# Patient Record
Sex: FEMALE | Race: WHITE | URBAN - METROPOLITAN AREA
[De-identification: names, ages, dates, MRNs, and addresses within clinical notes are randomized per-mention and may not be internally consistent; named-entity substitution may affect disease eponyms.]

---

## 2022-11-14 ENCOUNTER — OFFICE VISIT (OUTPATIENT)
Dept: URBAN - METROPOLITAN AREA CLINIC 58 | Facility: CLINIC | Age: 67
End: 2022-11-14
Payer: MEDICARE

## 2022-11-14 DIAGNOSIS — H40.013 OPEN ANGLE WITH BORDERLINE FINDINGS, LOW RISK, BILATERAL: ICD-10-CM

## 2022-11-14 DIAGNOSIS — H25.13 AGE-RELATED NUCLEAR CATARACT, BILATERAL: Primary | ICD-10-CM

## 2022-11-14 PROCEDURE — 99204 OFFICE O/P NEW MOD 45 MIN: CPT | Performed by: OPHTHALMOLOGY

## 2022-11-14 ASSESSMENT — KERATOMETRY
OD: 45.50
OS: 45.25

## 2022-11-14 ASSESSMENT — VISUAL ACUITY
OD: 20/30
OS: 20/40

## 2022-11-14 ASSESSMENT — INTRAOCULAR PRESSURE
OD: 14
OS: 14

## 2022-11-14 NOTE — IMPRESSION/PLAN
Impression: Open angle with borderline findings, low risk, bilateral: H40.013. Plan: Further evaluation with Dr. Nagi Ramirez after [address].

## 2022-11-14 NOTE — IMPRESSION/PLAN
Impression: Age-related nuclear cataract, bilateral: H25.13. Plan: Cataracts account for the patient's complaints. Discussed all risks, benefits, procedures and recovery for cataract surgery in detail with the patient. Patient understands changing glasses will not improve vision. Patient desires to have surgery, recommend phacoemulsification with intraocular lens implant. Discussed lens implant options. Patient would like standard lens with a distance refractive goal.  Patient understands that glasses are needed after surgery. Patient is Dextenza candidate.  RL2.

## 2023-01-06 ENCOUNTER — TESTING ONLY (OUTPATIENT)
Dept: URBAN - METROPOLITAN AREA CLINIC 58 | Facility: CLINIC | Age: 68
End: 2023-01-06
Payer: MEDICARE

## 2023-01-06 DIAGNOSIS — H25.13 AGE-RELATED NUCLEAR CATARACT, BILATERAL: Primary | ICD-10-CM

## 2023-01-06 PROCEDURE — W9997 IOL SELECTION: HCPCS | Performed by: OPHTHALMOLOGY

## 2023-01-06 ASSESSMENT — PACHYMETRY
OS: 3.37
OD: 23.40
OS: 23.29
OD: 4.01

## 2023-01-16 ENCOUNTER — SURGERY (OUTPATIENT)
Dept: URBAN - METROPOLITAN AREA SURGERY 32 | Facility: SURGERY | Age: 68
End: 2023-01-16
Payer: MEDICARE

## 2023-01-16 DIAGNOSIS — H25.13 AGE-RELATED NUCLEAR CATARACT, BILATERAL: Primary | ICD-10-CM

## 2023-01-16 PROCEDURE — 66984 XCAPSL CTRC RMVL W/O ECP: CPT | Performed by: OPHTHALMOLOGY

## 2023-01-17 ENCOUNTER — POST-OPERATIVE VISIT (OUTPATIENT)
Dept: URBAN - METROPOLITAN AREA CLINIC 58 | Facility: CLINIC | Age: 68
End: 2023-01-17
Payer: MEDICARE

## 2023-01-17 DIAGNOSIS — Z48.810 ENCOUNTER FOR SURGICAL AFTERCARE FOLLOWING SURGERY ON A SENSE ORGAN: Primary | ICD-10-CM

## 2023-01-17 PROCEDURE — 99024 POSTOP FOLLOW-UP VISIT: CPT | Performed by: OPTOMETRIST

## 2023-01-17 ASSESSMENT — INTRAOCULAR PRESSURE
OD: 18
OS: 18

## 2023-01-24 ENCOUNTER — POST-OPERATIVE VISIT (OUTPATIENT)
Dept: URBAN - METROPOLITAN AREA CLINIC 58 | Facility: CLINIC | Age: 68
End: 2023-01-24
Payer: MEDICARE

## 2023-01-24 DIAGNOSIS — Z48.810 ENCOUNTER FOR SURGICAL AFTERCARE FOLLOWING SURGERY ON THE SENSE ORGANS: Primary | ICD-10-CM

## 2023-01-24 PROCEDURE — 99024 POSTOP FOLLOW-UP VISIT: CPT | Performed by: OPTOMETRIST

## 2023-01-24 ASSESSMENT — INTRAOCULAR PRESSURE
OD: 18
OS: 15

## 2023-01-30 ENCOUNTER — SURGERY (OUTPATIENT)
Dept: URBAN - METROPOLITAN AREA SURGERY 32 | Facility: SURGERY | Age: 68
End: 2023-01-30
Payer: MEDICARE

## 2023-01-30 DIAGNOSIS — H25.11 AGE-RELATED NUCLEAR CATARACT, RIGHT EYE: Primary | ICD-10-CM

## 2023-01-30 PROCEDURE — 66984 XCAPSL CTRC RMVL W/O ECP: CPT | Performed by: OPHTHALMOLOGY

## 2023-01-31 ENCOUNTER — SURGERY (OUTPATIENT)
Dept: URBAN - METROPOLITAN AREA SURGERY 32 | Facility: SURGERY | Age: 68
End: 2023-01-31
Payer: MEDICARE

## 2023-01-31 DIAGNOSIS — Z96.1 PRESENCE OF INTRAOCULAR LENS: Primary | ICD-10-CM

## 2023-01-31 PROCEDURE — 99024 POSTOP FOLLOW-UP VISIT: CPT | Performed by: OPTOMETRIST

## 2023-01-31 ASSESSMENT — INTRAOCULAR PRESSURE
OS: 17
OD: 16

## 2023-02-06 ENCOUNTER — SURGERY (OUTPATIENT)
Dept: URBAN - METROPOLITAN AREA SURGERY 32 | Facility: SURGERY | Age: 68
End: 2023-02-06
Payer: MEDICARE

## 2023-02-06 DIAGNOSIS — Z48.810 ENCOUNTER FOR SURGICAL AFTERCARE FOLLOWING SURGERY ON A SENSE ORGAN: Primary | ICD-10-CM

## 2023-02-06 PROCEDURE — 99024 POSTOP FOLLOW-UP VISIT: CPT | Performed by: OPHTHALMOLOGY

## 2023-02-06 RX ORDER — PREDNISOLONE ACETATE 10 MG/ML
1 % SUSPENSION/ DROPS OPHTHALMIC
Qty: 5 | Refills: 0 | Status: ACTIVE
Start: 2023-02-06

## 2023-02-06 ASSESSMENT — INTRAOCULAR PRESSURE
OS: 16
OD: 12

## 2023-02-06 NOTE — IMPRESSION/PLAN
Impression: S/P Cataract Extraction by phacoemulsification with IOL placement OD - 7 Days. Encounter for surgical aftercare following surgery on a sense organ  Z48.810. Excellent post op course   Post operative instructions reviewed - Plan: Start prednisolone BID  OU for foreign body sensation. Call if any worsening.

## 2023-03-08 ENCOUNTER — POST-OPERATIVE VISIT (OUTPATIENT)
Dept: URBAN - METROPOLITAN AREA CLINIC 58 | Facility: CLINIC | Age: 68
End: 2023-03-08
Payer: MEDICARE

## 2023-03-08 DIAGNOSIS — Z96.1 PRESENCE OF INTRAOCULAR LENS: Primary | ICD-10-CM

## 2023-03-08 PROCEDURE — 99024 POSTOP FOLLOW-UP VISIT: CPT | Performed by: OPTOMETRIST

## 2023-03-08 ASSESSMENT — INTRAOCULAR PRESSURE
OS: 11
OD: 11

## 2023-03-08 ASSESSMENT — VISUAL ACUITY
OD: 20/20
OS: 20/20

## 2023-03-08 NOTE — IMPRESSION/PLAN
Impression: S/P Cataract Extraction by phacoemulsification with IOL placement OD - 37 Days. Presence of intraocular lens  Z96.1.  Excellent post op course   Condition is improving - Plan: no f/u [place] Using prednisolone bid od

## 2023-11-24 ENCOUNTER — LAB (OUTPATIENT)
Dept: LAB | Facility: LAB | Age: 68
End: 2023-11-24
Payer: COMMERCIAL

## 2023-11-24 DIAGNOSIS — R19.7 DIARRHEA, UNSPECIFIED: Primary | ICD-10-CM

## 2023-11-24 PROCEDURE — 87329 GIARDIA AG IA: CPT

## 2023-11-24 PROCEDURE — 87328 CRYPTOSPORIDIUM AG IA: CPT

## 2023-11-28 LAB
CRYPTOSP AG STL QL IA: NEGATIVE
G LAMBLIA AG STL QL IA: NEGATIVE

## 2023-11-29 LAB — O+P STL MICRO: NEGATIVE

## 2024-01-22 ENCOUNTER — OFFICE VISIT (OUTPATIENT)
Dept: GASTROENTEROLOGY | Facility: CLINIC | Age: 69
End: 2024-01-22
Payer: MEDICARE

## 2024-01-22 ENCOUNTER — LAB (OUTPATIENT)
Dept: LAB | Facility: LAB | Age: 69
End: 2024-01-22
Payer: MEDICARE

## 2024-01-22 VITALS
HEIGHT: 65 IN | SYSTOLIC BLOOD PRESSURE: 138 MMHG | HEART RATE: 65 BPM | OXYGEN SATURATION: 97 % | BODY MASS INDEX: 22.99 KG/M2 | DIASTOLIC BLOOD PRESSURE: 88 MMHG | WEIGHT: 138 LBS

## 2024-01-22 DIAGNOSIS — R19.7 DIARRHEA, UNSPECIFIED TYPE: ICD-10-CM

## 2024-01-22 DIAGNOSIS — R19.7 DIARRHEA, UNSPECIFIED TYPE: Primary | ICD-10-CM

## 2024-01-22 PROBLEM — M32.9 LUPUS (MULTI): Status: ACTIVE | Noted: 2023-08-07

## 2024-01-22 PROBLEM — C50.919 BREAST CANCER (MULTI): Status: ACTIVE | Noted: 2023-08-07

## 2024-01-22 PROBLEM — F17.200 SMOKER: Status: ACTIVE | Noted: 2023-08-07

## 2024-01-22 LAB
CRP SERPL-MCNC: <0.1 MG/DL
TSH SERPL-ACNC: 2.27 MIU/L (ref 0.44–3.98)

## 2024-01-22 PROCEDURE — 84443 ASSAY THYROID STIM HORMONE: CPT

## 2024-01-22 PROCEDURE — 86140 C-REACTIVE PROTEIN: CPT

## 2024-01-22 PROCEDURE — 83516 IMMUNOASSAY NONANTIBODY: CPT

## 2024-01-22 PROCEDURE — 99204 OFFICE O/P NEW MOD 45 MIN: CPT | Performed by: INTERNAL MEDICINE

## 2024-01-22 PROCEDURE — 1159F MED LIST DOCD IN RCRD: CPT | Performed by: INTERNAL MEDICINE

## 2024-01-22 PROCEDURE — 36415 COLL VENOUS BLD VENIPUNCTURE: CPT

## 2024-01-22 ASSESSMENT — ENCOUNTER SYMPTOMS
CONFUSION: 0
SORE THROAT: 0
ARTHRALGIAS: 0
FEVER: 0
BRUISES/BLEEDS EASILY: 0
NUMBNESS: 0
WHEEZING: 0
ROS GI COMMENTS: AS DETAILED ABOVE.
SHORTNESS OF BREATH: 0
DIZZINESS: 0
VOICE CHANGE: 0
TROUBLE SWALLOWING: 0
FATIGUE: 0
HEADACHES: 0
MYALGIAS: 0
SEIZURES: 0
NERVOUS/ANXIOUS: 0
WEAKNESS: 0
COUGH: 0
UNEXPECTED WEIGHT CHANGE: 0
HEMATURIA: 0
PALPITATIONS: 0
DYSURIA: 0
ADENOPATHY: 0
CHILLS: 0

## 2024-01-22 NOTE — PROGRESS NOTES
[facility]    ASSESSMENT and PLAN:       Navya Clements is a 68 y.o. female with a significant past medical history of HLD who presents for consultation requested by her primary care provider (Iveth Johnson DO) for the evaluation of diarrhea.       Diarrhea  Unclear etiology for subacute onset symptoms. 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 Other causes such as celiac, EPI, or IBD less likely. Active infectious issue less likely given duration of symptoms. Will order labs for further work up as well as colonoscopy to rule out IBD/microscopic colitis. She will continue Imodium to help with symptoms.  - labs ordered  - colonoscopy scheduled in endoscopy  - PRN imodium for diarrhea        Rajendra Hutton MD        Gastroenterology    [facility]    Clinical   [facility]        Subjective   HISTORY OF PRESENT ILLNESS:     Chief Complaint  Diarrhea    History Of Present Illness:    Navya Clements is a 68 y.o. female with a significant past medical history of HLD who presents for consultation requested by her primary care provider (Iveth Johnson DO) for the evaluation of diarrhea.       There are limited records in the  EMR including no notes from her PCP. There is nurse triage note from her PCP's office where it was recommended that she go to the ER for diarrhea.    There is an O&P from November 2023 that was negative. In August 2023 she did have Hep C testing through [facility] which showed a positive Ab with negative viral load consistent with spontaneous clearance of a previous infection or false positive Ab result.    She reports diarrhea that started a couple months ago. She denies any recent changes prior to these symptoms starting including no changes in medications, ill contacts, travel, or suspicious oral intake. Since that time she has been having 4-5 watery BMs in the AM with some urgency.  Overall she will have 5-8 soft or watery BMs per day. No blood in her stool. No nocturnal BMs. This has been associated with bloating and some diffuse abdominal cramping. No previous episodes similar to this. She has been taking Imodium (1-2 times per day). She was given Bentyl from the urgent care which did not help.    She has never had a colonoscopy.      Patient denies any heartburn/GERD, N/V, dysphagia, odynophagia, constipation, hematemesis, hematochezia, melena, or weight loss.      Review of systems:   Review of Systems   Constitutional:  Negative for chills, fatigue, fever and unexpected weight change.   HENT:  Negative for congestion, sneezing, sore throat, trouble swallowing and voice change.    Respiratory:  Negative for cough, shortness of breath and wheezing.    Cardiovascular:  Negative for chest pain, palpitations and leg swelling.   Gastrointestinal:         As detailed above.   Genitourinary:  Negative for dysuria and hematuria.   Musculoskeletal:  Negative for arthralgias and myalgias.   Skin:  Negative for pallor and rash.   Neurological:  Negative for dizziness, seizures, syncope, weakness, numbness and headaches.   Hematological:  Negative for adenopathy. Does not bruise/bleed easily.   Psychiatric/Behavioral:  Negative for confusion. The patient is not nervous/anxious.    All other systems reviewed and are negative.      I performed a complete 10 point review of systems and it is negative except as noted in HPI or above.      PAST HISTORIES:       Past Medical History:  She has no past medical history on file.    Past Surgical History:  She has no past surgical history on file.      Social History:  She reports that she has been smoking cigarettes. She has been smoking an average of .5 packs per day. She has never used smokeless tobacco. She reports current alcohol use. She reports that she does not use drugs.    Family History:  No known GI disease, specifically denies pancreatitis, Crohn's,  "colon cancer, gastroesophageal cancer, or ulcerative colitis.    No family history on file.     Allergies:  Patient has no known allergies.      Objective   OBJECTIVE:       Last Recorded Vitals:  Vitals:    01/22/24 1313   BP: 138/88   Pulse: 65   SpO2: 97%   Weight: 62.6 kg (138 lb)   Height: 1.651 m (5' 5\")     /88   Pulse 65   Ht 1.651 m (5' 5\")   Wt 62.6 kg (138 lb)   SpO2 97%   BMI 22.96 kg/m²      Physical Exam:    Physical Exam  Vitals reviewed.   Constitutional:       General: She is not in acute distress.     Appearance: She is not ill-appearing.   HENT:      Head: Normocephalic and atraumatic.   Eyes:      General: No scleral icterus.  Cardiovascular:      Rate and Rhythm: Normal rate and regular rhythm.      Pulses: Normal pulses.      Heart sounds: Normal heart sounds. No murmur heard.  Pulmonary:      Effort: Pulmonary effort is normal. No respiratory distress.      Breath sounds: Normal breath sounds. No wheezing.   Abdominal:      General: Bowel sounds are normal.      Palpations: Abdomen is soft.      Tenderness: There is no abdominal tenderness. There is no rebound.   Musculoskeletal:         General: No swelling or deformity.   Skin:     General: Skin is warm and dry.      Coloration: Skin is not jaundiced.      Findings: No rash.   Neurological:      General: No focal deficit present.      Mental Status: She is alert and oriented to person, place, and time.   Psychiatric:         Mood and Affect: Mood normal.         Behavior: Behavior normal.         Thought Content: Thought content normal.         Judgment: Judgment normal.         Home Medications:  Prior to Admission medications    Not on File         Relevant Results Recent labs reviewed in the EMR.    No results found for: \"WBC\", \"HGB\", \"MCV\", \"PLT\", \"IRON\", \"TIBC\", \"IRONSAT\", \"FERRITIN\", \"PDLPOGWK58\", \"FOLATE\"    No results found for: \"NA\", \"K\", \"CL\", \"BUN\", \"CREATININE\"    No results found for: \"BILITOT\", \"BILIDIR\", \"ALKPHOS\", " "\"AST\", \"ALT\", \"LIPASE\"    No results found for: \"CRP\", \"CALPS\"    Radiology: Imaging reviewed in the EMR.  No results found.          "

## 2024-01-22 NOTE — LETTER
January 22, 2024     Nova Lorenz PA-C  [address] [place], [place] [address] [address]    Patient: Navya Clements   YOB: 1955   Date of Visit: 1/22/2024       Dear Dr. Nova Lorenz PA-C:    Thank you for referring Navya Clements to me for evaluation. Below are my notes for this consultation.  If you have questions, please do not hesitate to call me. I look forward to following your patient along with you.       Sincerely,     Rajendra Hutton MD      CC: Iveth Johnson DO  ______________________________________________________________________________________        [facility]    ASSESSMENT and PLAN:       Navya Clements is a 68 y.o. female with a significant past medical history of HLD who presents for consultation requested by her primary care provider (Iveth Johnson DO) for the evaluation of diarrhea.       Diarrhea  Unclear etiology for subacute onset symptoms. 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 Other causes such as celiac, EPI, or IBD less likely. Active infectious issue less likely given duration of symptoms. Will order labs for further work up as well as colonoscopy to rule out IBD/microscopic colitis. She will continue Imodium to help with symptoms.  - labs ordered  - colonoscopy scheduled in endoscopy  - PRN imodium for diarrhea        Rajendra Hutton MD        Gastroenterology    [facility]    Clinical   [facility]        Subjective  HISTORY OF PRESENT ILLNESS:     Chief Complaint  Diarrhea    History Of Present Illness:    Navya Clements is a 68 y.o. female with a significant past medical history of HLD who presents for consultation requested by her primary care provider (Iveth Johnson DO) for the evaluation of diarrhea.       There are limited records in the  EMR including no notes from her PCP. There is nurse triage note from her PCP's office where it was  recommended that she go to the ER for diarrhea.    There is an O&P from November 2023 that was negative. In August 2023 she did have Hep C testing through [facility] which showed a positive Ab with negative viral load consistent with spontaneous clearance of a previous infection or false positive Ab result.    She reports diarrhea that started a couple months ago. She denies any recent changes prior to these symptoms starting including no changes in medications, ill contacts, travel, or suspicious oral intake. Since that time she has been having 4-5 watery BMs in the AM with some urgency. Overall she will have 5-8 soft or watery BMs per day. No blood in her stool. No nocturnal BMs. This has been associated with bloating and some diffuse abdominal cramping. No previous episodes similar to this. She has been taking Imodium (1-2 times per day). She was given Bentyl from the urgent care which did not help.    She has never had a colonoscopy.      Patient denies any heartburn/GERD, N/V, dysphagia, odynophagia, constipation, hematemesis, hematochezia, melena, or weight loss.      Review of systems:   Review of Systems   Constitutional:  Negative for chills, fatigue, fever and unexpected weight change.   HENT:  Negative for congestion, sneezing, sore throat, trouble swallowing and voice change.    Respiratory:  Negative for cough, shortness of breath and wheezing.    Cardiovascular:  Negative for chest pain, palpitations and leg swelling.   Gastrointestinal:         As detailed above.   Genitourinary:  Negative for dysuria and hematuria.   Musculoskeletal:  Negative for arthralgias and myalgias.   Skin:  Negative for pallor and rash.   Neurological:  Negative for dizziness, seizures, syncope, weakness, numbness and headaches.   Hematological:  Negative for adenopathy. Does not bruise/bleed easily.   Psychiatric/Behavioral:  Negative for confusion. The patient is not nervous/anxious.    All other systems reviewed and are  "negative.      I performed a complete 10 point review of systems and it is negative except as noted in HPI or above.      PAST HISTORIES:       Past Medical History:  She has no past medical history on file.    Past Surgical History:  She has no past surgical history on file.      Social History:  She reports that she has been smoking cigarettes. She has been smoking an average of .5 packs per day. She has never used smokeless tobacco. She reports current alcohol use. She reports that she does not use drugs.    Family History:  No known GI disease, specifically denies pancreatitis, Crohn's, colon cancer, gastroesophageal cancer, or ulcerative colitis.    No family history on file.     Allergies:  Patient has no known allergies.      Objective  OBJECTIVE:       Last Recorded Vitals:  Vitals:    01/22/24 1313   BP: 138/88   Pulse: 65   SpO2: 97%   Weight: 62.6 kg (138 lb)   Height: 1.651 m (5' 5\")     /88   Pulse 65   Ht 1.651 m (5' 5\")   Wt 62.6 kg (138 lb)   SpO2 97%   BMI 22.96 kg/m²      Physical Exam:    Physical Exam  Vitals reviewed.   Constitutional:       General: She is not in acute distress.     Appearance: She is not ill-appearing.   HENT:      Head: Normocephalic and atraumatic.   Eyes:      General: No scleral icterus.  Cardiovascular:      Rate and Rhythm: Normal rate and regular rhythm.      Pulses: Normal pulses.      Heart sounds: Normal heart sounds. No murmur heard.  Pulmonary:      Effort: Pulmonary effort is normal. No respiratory distress.      Breath sounds: Normal breath sounds. No wheezing.   Abdominal:      General: Bowel sounds are normal.      Palpations: Abdomen is soft.      Tenderness: There is no abdominal tenderness. There is no rebound.   Musculoskeletal:         General: No swelling or deformity.   Skin:     General: Skin is warm and dry.      Coloration: Skin is not jaundiced.      Findings: No rash.   Neurological:      General: No focal deficit present.      Mental " "Status: She is alert and oriented to person, place, and time.   Psychiatric:         Mood and Affect: Mood normal.         Behavior: Behavior normal.         Thought Content: Thought content normal.         Judgment: Judgment normal.         Home Medications:  Prior to Admission medications    Not on File         Relevant Results Recent labs reviewed in the EMR.    No results found for: \"WBC\", \"HGB\", \"MCV\", \"PLT\", \"IRON\", \"TIBC\", \"IRONSAT\", \"FERRITIN\", \"LHRXYSTO09\", \"FOLATE\"    No results found for: \"NA\", \"K\", \"CL\", \"BUN\", \"CREATININE\"    No results found for: \"BILITOT\", \"BILIDIR\", \"ALKPHOS\", \"AST\", \"ALT\", \"LIPASE\"    No results found for: \"CRP\", \"CALPS\"    Radiology: Imaging reviewed in the EMR.  No results found.          "

## 2024-01-22 NOTE — PATIENT INSTRUCTIONS
I have ordered labs to evaluate your diarrhea.      I recommend that you use Imodium for diarrhea. You can take this as needed. If you need to take it regularly that is okay. Start by taking it once per day. I would suggest taking it first thing in the morning, before breakfast. You can gradually add additional doses before lunch, before dinner, and at bedtime if you need to. If you are taking one pill four times per day and still having symptoms let me know.        You have been scheduled for a colonoscopy.  You were given instructions for preparing for this test in the office today.  If you have questions about these instructions, please call my office at [phone].    After your procedure, you can expect me to talk to you to go over the results of the procedure. If polyps were removed I will usually be able to tell you my initial thoughts about those polyps and give you some idea of when you should have another colonoscopy.    If any polyps are removed during your procedure or if any biopsies are obtained those specimens will go to the pathologists to review under the microscope. Once those results are available they will be sent to me electronically to review. These results will also be available to you at that time through the patient portal. I briefly review all of these results by the next business day to determine if there is any urgent information that needs to be communicated to you right away or anything that would significantly change what I told you at the time of the procedure. If there is nothing urgent this is usually a good sign and I will then do a more extensive review of the result and send you a letter with my final recommendations within a week of the result becoming available. If you have questions or need additional information I urge you to call the office at [phone], but I do ask for patience as I spend a good portion of each day performing procedures like the one you are  scheduled for and during those times I am not able to take or return routine phone calls.    You were also given information regarding the schedule for your procedure including the time that you need to arrive to the endoscopy unit.  You will also be contacted 2-3 day prior to your procedure to confirm the final arrival time.  If you have questions about this or if you need to cancel or change this appointment please call my office at [phone].        Follow up in 3-4 months.

## 2024-01-23 LAB — TTG IGA SER IA-ACNC: <1 U/ML

## 2024-01-31 ENCOUNTER — LAB (OUTPATIENT)
Dept: LAB | Facility: LAB | Age: 69
End: 2024-01-31
Payer: MEDICARE

## 2024-01-31 ENCOUNTER — OFFICE VISIT (OUTPATIENT)
Dept: PRIMARY CARE | Facility: CLINIC | Age: 69
End: 2024-01-31
Payer: MEDICARE

## 2024-01-31 VITALS
TEMPERATURE: 97.6 F | WEIGHT: 137 LBS | SYSTOLIC BLOOD PRESSURE: 122 MMHG | HEIGHT: 65 IN | HEART RATE: 72 BPM | DIASTOLIC BLOOD PRESSURE: 82 MMHG | BODY MASS INDEX: 22.82 KG/M2

## 2024-01-31 DIAGNOSIS — R19.7 DIARRHEA, UNSPECIFIED TYPE: ICD-10-CM

## 2024-01-31 DIAGNOSIS — C50.912 MALIGNANT NEOPLASM OF LEFT FEMALE BREAST, UNSPECIFIED ESTROGEN RECEPTOR STATUS, UNSPECIFIED SITE OF BREAST (MULTI): ICD-10-CM

## 2024-01-31 DIAGNOSIS — E78.2 MIXED HYPERLIPIDEMIA: ICD-10-CM

## 2024-01-31 DIAGNOSIS — M32.9 LUPUS (MULTI): ICD-10-CM

## 2024-01-31 DIAGNOSIS — G62.9 NEUROPATHY: ICD-10-CM

## 2024-01-31 DIAGNOSIS — Z00.00 ROUTINE GENERAL MEDICAL EXAMINATION AT HEALTH CARE FACILITY: Primary | ICD-10-CM

## 2024-01-31 DIAGNOSIS — F33.0 MILD EPISODE OF RECURRENT MAJOR DEPRESSIVE DISORDER (CMS-HCC): ICD-10-CM

## 2024-01-31 DIAGNOSIS — Z12.31 ENCOUNTER FOR SCREENING MAMMOGRAM FOR MALIGNANT NEOPLASM OF BREAST: ICD-10-CM

## 2024-01-31 DIAGNOSIS — G47.00 INSOMNIA, UNSPECIFIED TYPE: ICD-10-CM

## 2024-01-31 DIAGNOSIS — E55.9 VITAMIN D DEFICIENCY: ICD-10-CM

## 2024-01-31 DIAGNOSIS — Z01.84 IMMUNITY STATUS TESTING: ICD-10-CM

## 2024-01-31 DIAGNOSIS — R76.8 HEPATITIS C ANTIBODY TEST POSITIVE: ICD-10-CM

## 2024-01-31 DIAGNOSIS — Z45.2 ENCOUNTER FOR REMOVAL OF PERIPHERALLY INSERTED CENTRAL CATHETER: ICD-10-CM

## 2024-01-31 DIAGNOSIS — H69.90 DISORDER OF EUSTACHIAN TUBE, UNSPECIFIED LATERALITY: ICD-10-CM

## 2024-01-31 PROBLEM — F41.8 MIXED ANXIETY AND DEPRESSIVE DISORDER: Status: ACTIVE | Noted: 2024-01-31

## 2024-01-31 LAB
25(OH)D3 SERPL-MCNC: 17 NG/ML (ref 30–100)
ALBUMIN SERPL BCP-MCNC: 4.3 G/DL (ref 3.4–5)
ALP SERPL-CCNC: 66 U/L (ref 33–136)
ALT SERPL W P-5'-P-CCNC: 13 U/L (ref 7–45)
ANION GAP SERPL CALC-SCNC: 12 MMOL/L (ref 10–20)
AST SERPL W P-5'-P-CCNC: 13 U/L (ref 9–39)
BILIRUB SERPL-MCNC: 1 MG/DL (ref 0–1.2)
BUN SERPL-MCNC: 12 MG/DL (ref 6–23)
CALCIUM SERPL-MCNC: 9.5 MG/DL (ref 8.6–10.3)
CHLORIDE SERPL-SCNC: 108 MMOL/L (ref 98–107)
CHOLEST SERPL-MCNC: 284 MG/DL (ref 0–199)
CHOLESTEROL/HDL RATIO: 4.5
CO2 SERPL-SCNC: 27 MMOL/L (ref 21–32)
CREAT SERPL-MCNC: 0.78 MG/DL (ref 0.5–1.05)
EGFRCR SERPLBLD CKD-EPI 2021: 82 ML/MIN/1.73M*2
GLUCOSE SERPL-MCNC: 87 MG/DL (ref 74–99)
HDLC SERPL-MCNC: 62.8 MG/DL
LDLC SERPL CALC-MCNC: 188 MG/DL
NON HDL CHOLESTEROL: 221 MG/DL (ref 0–149)
POTASSIUM SERPL-SCNC: 3.9 MMOL/L (ref 3.5–5.3)
PROT SERPL-MCNC: 6.4 G/DL (ref 6.4–8.2)
SODIUM SERPL-SCNC: 143 MMOL/L (ref 136–145)
TRIGL SERPL-MCNC: 165 MG/DL (ref 0–149)
VLDL: 33 MG/DL (ref 0–40)

## 2024-01-31 PROCEDURE — 99387 INIT PM E/M NEW PAT 65+ YRS: CPT | Performed by: FAMILY MEDICINE

## 2024-01-31 PROCEDURE — G0439 PPPS, SUBSEQ VISIT: HCPCS | Performed by: FAMILY MEDICINE

## 2024-01-31 PROCEDURE — 80053 COMPREHEN METABOLIC PANEL: CPT

## 2024-01-31 PROCEDURE — 86708 HEPATITIS A ANTIBODY: CPT

## 2024-01-31 PROCEDURE — 99203 OFFICE O/P NEW LOW 30 MIN: CPT | Performed by: FAMILY MEDICINE

## 2024-01-31 PROCEDURE — 1170F FXNL STATUS ASSESSED: CPT | Performed by: FAMILY MEDICINE

## 2024-01-31 PROCEDURE — 82306 VITAMIN D 25 HYDROXY: CPT

## 2024-01-31 PROCEDURE — 87506 IADNA-DNA/RNA PROBE TQ 6-11: CPT

## 2024-01-31 PROCEDURE — 80061 LIPID PANEL: CPT

## 2024-01-31 PROCEDURE — 36415 COLL VENOUS BLD VENIPUNCTURE: CPT

## 2024-01-31 PROCEDURE — 82653 EL-1 FECAL QUANTITATIVE: CPT

## 2024-01-31 PROCEDURE — 86706 HEP B SURFACE ANTIBODY: CPT

## 2024-01-31 PROCEDURE — 87493 C DIFF AMPLIFIED PROBE: CPT

## 2024-01-31 PROCEDURE — G0446 INTENS BEHAVE THER CARDIO DX: HCPCS | Performed by: FAMILY MEDICINE

## 2024-01-31 PROCEDURE — 1160F RVW MEDS BY RX/DR IN RCRD: CPT | Performed by: FAMILY MEDICINE

## 2024-01-31 PROCEDURE — 83993 ASSAY FOR CALPROTECTIN FECAL: CPT

## 2024-01-31 PROCEDURE — 1159F MED LIST DOCD IN RCRD: CPT | Performed by: FAMILY MEDICINE

## 2024-01-31 RX ORDER — CITALOPRAM 10 MG/1
10 TABLET ORAL DAILY
Status: CANCELLED | OUTPATIENT
Start: 2024-01-31

## 2024-01-31 RX ORDER — DULOXETIN HYDROCHLORIDE 30 MG/1
30 CAPSULE, DELAYED RELEASE ORAL DAILY
Status: CANCELLED | OUTPATIENT
Start: 2024-01-31

## 2024-01-31 RX ORDER — TRAZODONE HYDROCHLORIDE 50 MG/1
50 TABLET ORAL NIGHTLY
Status: CANCELLED | OUTPATIENT
Start: 2024-01-31

## 2024-01-31 RX ORDER — GABAPENTIN 300 MG/1
300 CAPSULE ORAL 3 TIMES DAILY
Qty: 270 CAPSULE | Refills: 0 | Status: SHIPPED | OUTPATIENT
Start: 2024-01-31 | End: 2024-05-03 | Stop reason: SDUPTHER

## 2024-01-31 RX ORDER — TRAZODONE HYDROCHLORIDE 50 MG/1
50 TABLET ORAL NIGHTLY
COMMUNITY
End: 2024-01-31 | Stop reason: SDUPTHER

## 2024-01-31 RX ORDER — DULOXETIN HYDROCHLORIDE 30 MG/1
30 CAPSULE, DELAYED RELEASE ORAL DAILY
COMMUNITY
Start: 2023-05-12 | End: 2024-01-31 | Stop reason: SDUPTHER

## 2024-01-31 RX ORDER — TRAZODONE HYDROCHLORIDE 50 MG/1
50 TABLET ORAL NIGHTLY
Qty: 90 TABLET | Refills: 0 | Status: SHIPPED | OUTPATIENT
Start: 2024-01-31 | End: 2024-05-03 | Stop reason: SDUPTHER

## 2024-01-31 RX ORDER — GABAPENTIN 300 MG/1
300 CAPSULE ORAL 3 TIMES DAILY
Status: CANCELLED | OUTPATIENT
Start: 2024-01-31

## 2024-01-31 RX ORDER — DULOXETIN HYDROCHLORIDE 30 MG/1
30 CAPSULE, DELAYED RELEASE ORAL DAILY
Qty: 30 CAPSULE | Refills: 2 | Status: SHIPPED | OUTPATIENT
Start: 2024-01-31 | End: 2024-05-06 | Stop reason: SDUPTHER

## 2024-01-31 RX ORDER — GABAPENTIN 300 MG/1
300 CAPSULE ORAL 3 TIMES DAILY
COMMUNITY
End: 2024-01-31 | Stop reason: SDUPTHER

## 2024-01-31 RX ORDER — CITALOPRAM 10 MG/1
10 TABLET ORAL DAILY
COMMUNITY
End: 2024-01-31

## 2024-01-31 RX ORDER — FLUTICASONE PROPIONATE 50 MCG
1 SPRAY, SUSPENSION (ML) NASAL DAILY
Qty: 16 G | Refills: 0 | Status: SHIPPED | OUTPATIENT
Start: 2024-01-31 | End: 2025-01-30

## 2024-01-31 ASSESSMENT — ENCOUNTER SYMPTOMS
DIZZINESS: 0
HEADACHES: 0
PALPITATIONS: 0
ABDOMINAL PAIN: 0
DYSPHORIC MOOD: 0
FATIGUE: 0
POLYPHAGIA: 0
DIFFICULTY URINATING: 0
ARTHRALGIAS: 0
NAUSEA: 0
POLYDIPSIA: 0
SHORTNESS OF BREATH: 0
DYSURIA: 0
CONSTIPATION: 0
DIARRHEA: 0
BLOOD IN STOOL: 0
SLEEP DISTURBANCE: 0
VOMITING: 0
MYALGIAS: 0

## 2024-01-31 ASSESSMENT — ACTIVITIES OF DAILY LIVING (ADL)
BATHING: INDEPENDENT
GROCERY_SHOPPING: INDEPENDENT
DRESSING: INDEPENDENT
MANAGING_FINANCES: INDEPENDENT
DOING_HOUSEWORK: INDEPENDENT
TAKING_MEDICATION: INDEPENDENT

## 2024-01-31 ASSESSMENT — PATIENT HEALTH QUESTIONNAIRE - PHQ9
2. FEELING DOWN, DEPRESSED OR HOPELESS: NOT AT ALL
SUM OF ALL RESPONSES TO PHQ9 QUESTIONS 1 AND 2: 0
1. LITTLE INTEREST OR PLEASURE IN DOING THINGS: NOT AT ALL

## 2024-01-31 NOTE — PROGRESS NOTES
"Subjective   Reason for Visit: Navya Clements is an 68 y.o. female here for a Medicare Wellness visit, multiple issues and CPE    Past Medical, Surgical, and Family History reviewed and updated in chart.    Reviewed all medications by prescribing practitioner or clinical pharmacist (such as prescriptions, OTCs, herbal therapies and supplements) and documented in the medical record.    HPI    Patient Care Team:  Iveth Johnson DO as PCP - General (Family Medicine)  Iveth Johnson DO as PCP - [employer] PCP   Dr. Hutton-GI    Lipids: due for recheck. Last HDL 77, , . Was on statin in past.   Hep C: had positive ab 6 mo ago but neg quant. Pt unsure of prior exposure.   Breast CA: left side s/p  mastectomy. Due for mammogram  Port: requesting surgery referral to remove port.   Lupus: stable.   ETD: chronic left side. Flonase helps. Needs refill  Vit D def: due for recheck.  Mood/insomnia: stable on med.   Neuropathy: stable on kentrell.     Pain: none  Review of Systems   Constitutional:  Negative for fatigue.   HENT:  Positive for hearing loss.         +chronic left ear pressure   Eyes:  Negative for visual disturbance.   Respiratory:  Negative for shortness of breath.    Cardiovascular:  Negative for chest pain and palpitations.   Gastrointestinal:  Negative for abdominal pain, blood in stool, constipation, diarrhea, nausea and vomiting.   Endocrine: Negative for cold intolerance, heat intolerance, polydipsia, polyphagia and polyuria.   Genitourinary:  Negative for difficulty urinating and dysuria.   Musculoskeletal:  Negative for arthralgias and myalgias.   Skin:  Negative for rash.   Allergic/Immunologic: Positive for environmental allergies.   Neurological:  Negative for dizziness and headaches.   Psychiatric/Behavioral:  Negative for dysphoric mood and sleep disturbance.        Objective   Vitals:  /82   Pulse 72   Temp 36.4 °C (97.6 °F)   Ht 1.651 m (5' 5\")   Wt 62.1 kg (137 " lb)   BMI 22.80 kg/m²       Physical Exam  Vitals and nursing note reviewed.   Constitutional:       General: She is not in acute distress.     Appearance: Normal appearance. She is not toxic-appearing.   HENT:      Head: Normocephalic.      Right Ear: Tympanic membrane normal.      Left Ear: Tympanic membrane normal.      Nose: Nose normal.      Mouth/Throat:      Pharynx: Oropharynx is clear.   Eyes:      General: No scleral icterus.     Pupils: Pupils are equal, round, and reactive to light.   Neck:      Vascular: No carotid bruit.   Cardiovascular:      Rate and Rhythm: Normal rate and regular rhythm.      Heart sounds: No murmur heard.  Pulmonary:      Effort: Pulmonary effort is normal. No respiratory distress.      Breath sounds: Normal breath sounds.   Abdominal:      General: Bowel sounds are normal.      Palpations: Abdomen is soft.      Tenderness: There is no abdominal tenderness. There is no guarding.   Musculoskeletal:         General: No tenderness.      Cervical back: Neck supple.      Right lower leg: No edema.      Left lower leg: No edema.   Lymphadenopathy:      Cervical: Cervical adenopathy present.   Skin:     General: Skin is warm.   Neurological:      General: No focal deficit present.      Mental Status: She is alert.      Cranial Nerves: No cranial nerve deficit.   Psychiatric:         Mood and Affect: Mood normal.         Assessment/Plan   Problem List Items Addressed This Visit       Breast cancer (CMS/HCC)    Relevant Orders    BI mammo right screening tomosynthesis    Lupus (CMS/HCC)    Mixed hyperlipidemia    Relevant Orders    Comprehensive Metabolic Panel (Completed)    Lipid Panel (Completed)    Neuropathy    Relevant Medications    DULoxetine (Cymbalta) 30 mg DR capsule    gabapentin (Neurontin) 300 mg capsule    Vitamin D deficiency    Relevant Orders    Vitamin D 25-Hydroxy,Total (for eval of Vitamin D levels) (Completed)     Other Visit Diagnoses       Routine general medical  examination at health care facility    -  Primary    Encounter for removal of peripherally inserted central catheter        Relevant Orders    Referral to General Surgery    Hepatitis C antibody test positive        Relevant Orders    Referral to Hepatology    Immunity status testing        Relevant Orders    Hepatitis A Antibody, Total    Hepatitis B Surface Antibody    Disorder of Eustachian tube, unspecified laterality        Relevant Medications    fluticasone (Flonase) 50 mcg/actuation nasal spray    Encounter for screening mammogram for malignant neoplasm of breast        Relevant Orders    BI mammo right screening tomosynthesis    Mild episode of recurrent major depressive disorder (CMS/HCC)        Relevant Medications    DULoxetine (Cymbalta) 30 mg DR capsule    Insomnia, unspecified type        Relevant Medications    traZODone (Desyrel) 50 mg tablet          Discussed prior blood work and wellness issues. Reviewed screenings and immunizations. Recommendations given    ASCVD risk counseling:  discussed ASCVD risk and score.  Aspirin not indicated at this time. Lifestyle modifications including nutritional choices, exercise and trying to eliminate habits contributing to risk were discussed. Patient agreeable to make efforts to continue to control their current cardiovascular risk factors.

## 2024-01-31 NOTE — PATIENT INSTRUCTIONS
Recommend a predominant low fat whole foods plant based diet.  Cut back on meat, dairy, processed carbs, salt and oils. Increase fiber in your diet.  Decrease alcohol as much as possible if you drink. Recommend regular exercise most days of the week(goal up to 150min per week). Also recommend good sleep habits aiming for 7-8 hours per night.     You were referred to liver specialists, surgeon, and for mammogram and blood work    Stop celexa. If mood worsening, will increase duloxetine.    Follow up with your specialists as scheduled    Return in 2 weeks for recheck, sooner if needed

## 2024-02-01 LAB
C COLI+JEJ+UPSA DNA STL QL NAA+PROBE: NOT DETECTED
C DIF TOX TCDA+TCDB STL QL NAA+PROBE: NOT DETECTED
EC STX1 GENE STL QL NAA+PROBE: NOT DETECTED
EC STX2 GENE STL QL NAA+PROBE: NOT DETECTED
HAV AB SER QL IA: NONREACTIVE
HBV SURFACE AB SER-ACNC: 189.4 MIU/ML
NOROVIRUS GI + GII RNA STL NAA+PROBE: NOT DETECTED
RV RNA STL NAA+PROBE: NOT DETECTED
SALMONELLA DNA STL QL NAA+PROBE: NOT DETECTED
SHIGELLA DNA SPEC QL NAA+PROBE: NOT DETECTED
V CHOLERAE DNA STL QL NAA+PROBE: NOT DETECTED
Y ENTEROCOL DNA STL QL NAA+PROBE: NOT DETECTED

## 2024-02-03 LAB
CALPROTECTIN STL-MCNT: 712 UG/G
ELASTASE PANC STL-MCNT: 378 UG/G

## 2024-02-05 ENCOUNTER — PREP FOR PROCEDURE (OUTPATIENT)
Dept: GASTROENTEROLOGY | Facility: CLINIC | Age: 69
End: 2024-02-05

## 2024-02-05 ENCOUNTER — ANESTHESIA EVENT (OUTPATIENT)
Dept: GASTROENTEROLOGY | Facility: HOSPITAL | Age: 69
End: 2024-02-05
Payer: MEDICARE

## 2024-02-05 RX ORDER — SODIUM CHLORIDE 9 MG/ML
20 INJECTION, SOLUTION INTRAVENOUS CONTINUOUS
Status: CANCELLED | OUTPATIENT
Start: 2024-02-05

## 2024-02-06 ENCOUNTER — HOSPITAL ENCOUNTER (OUTPATIENT)
Dept: GASTROENTEROLOGY | Facility: HOSPITAL | Age: 69
Discharge: HOME | End: 2024-02-06
Payer: MEDICARE

## 2024-02-06 ENCOUNTER — ANESTHESIA (OUTPATIENT)
Dept: GASTROENTEROLOGY | Facility: HOSPITAL | Age: 69
End: 2024-02-06
Payer: MEDICARE

## 2024-02-06 VITALS
WEIGHT: 138 LBS | OXYGEN SATURATION: 100 % | SYSTOLIC BLOOD PRESSURE: 154 MMHG | DIASTOLIC BLOOD PRESSURE: 94 MMHG | BODY MASS INDEX: 23.56 KG/M2 | TEMPERATURE: 97.5 F | HEIGHT: 64 IN | RESPIRATION RATE: 18 BRPM | HEART RATE: 64 BPM

## 2024-02-06 DIAGNOSIS — R19.7 DIARRHEA, UNSPECIFIED TYPE: ICD-10-CM

## 2024-02-06 PROCEDURE — 3700000001 HC GENERAL ANESTHESIA TIME - INITIAL BASE CHARGE

## 2024-02-06 PROCEDURE — 0753T DGTZ GLS MCRSCP SLD LEVEL IV: CPT | Mod: TC,SUR,PORLAB,WESLAB,MUE | Performed by: INTERNAL MEDICINE

## 2024-02-06 PROCEDURE — 7100000010 HC PHASE TWO TIME - EACH INCREMENTAL 1 MINUTE

## 2024-02-06 PROCEDURE — 45388 COLONOSCOPY W/ABLATION: CPT | Performed by: INTERNAL MEDICINE

## 2024-02-06 PROCEDURE — 45378 DIAGNOSTIC COLONOSCOPY: CPT | Performed by: INTERNAL MEDICINE

## 2024-02-06 PROCEDURE — 2500000004 HC RX 250 GENERAL PHARMACY W/ HCPCS (ALT 636 FOR OP/ED): Performed by: NURSE ANESTHETIST, CERTIFIED REGISTERED

## 2024-02-06 PROCEDURE — 7100000009 HC PHASE TWO TIME - INITIAL BASE CHARGE

## 2024-02-06 PROCEDURE — 3700000002 HC GENERAL ANESTHESIA TIME - EACH INCREMENTAL 1 MINUTE

## 2024-02-06 PROCEDURE — 2720000007 HC OR 272 NO HCPCS

## 2024-02-06 PROCEDURE — 2500000004 HC RX 250 GENERAL PHARMACY W/ HCPCS (ALT 636 FOR OP/ED): Performed by: INTERNAL MEDICINE

## 2024-02-06 PROCEDURE — 2500000005 HC RX 250 GENERAL PHARMACY W/O HCPCS: Performed by: NURSE ANESTHETIST, CERTIFIED REGISTERED

## 2024-02-06 PROCEDURE — 88305 TISSUE EXAM BY PATHOLOGIST: CPT | Performed by: PATHOLOGY

## 2024-02-06 RX ORDER — LIDOCAINE HYDROCHLORIDE 20 MG/ML
INJECTION, SOLUTION INFILTRATION; PERINEURAL AS NEEDED
Status: DISCONTINUED | OUTPATIENT
Start: 2024-02-06 | End: 2024-02-06

## 2024-02-06 RX ORDER — SODIUM CHLORIDE 9 MG/ML
20 INJECTION, SOLUTION INTRAVENOUS CONTINUOUS
Status: DISCONTINUED | OUTPATIENT
Start: 2024-02-06 | End: 2024-02-07 | Stop reason: HOSPADM

## 2024-02-06 RX ORDER — PROPOFOL 10 MG/ML
INJECTION, EMULSION INTRAVENOUS AS NEEDED
Status: DISCONTINUED | OUTPATIENT
Start: 2024-02-06 | End: 2024-02-06

## 2024-02-06 RX ADMIN — SODIUM CHLORIDE 20 ML/HR: 9 INJECTION, SOLUTION INTRAVENOUS at 13:21

## 2024-02-06 RX ADMIN — LIDOCAINE HYDROCHLORIDE 100 MG: 20 INJECTION, SOLUTION INFILTRATION; PERINEURAL at 13:54

## 2024-02-06 RX ADMIN — PROPOFOL 280 MG: 10 INJECTION, EMULSION INTRAVENOUS at 13:54

## 2024-02-06 SDOH — HEALTH STABILITY: MENTAL HEALTH: CURRENT SMOKER: 1

## 2024-02-06 ASSESSMENT — COLUMBIA-SUICIDE SEVERITY RATING SCALE - C-SSRS
2. HAVE YOU ACTUALLY HAD ANY THOUGHTS OF KILLING YOURSELF?: NO
6. HAVE YOU EVER DONE ANYTHING, STARTED TO DO ANYTHING, OR PREPARED TO DO ANYTHING TO END YOUR LIFE?: NO
1. IN THE PAST MONTH, HAVE YOU WISHED YOU WERE DEAD OR WISHED YOU COULD GO TO SLEEP AND NOT WAKE UP?: NO

## 2024-02-06 ASSESSMENT — PAIN SCALES - GENERAL
PAINLEVEL_OUTOF10: 0 - NO PAIN

## 2024-02-06 ASSESSMENT — PAIN - FUNCTIONAL ASSESSMENT
PAIN_FUNCTIONAL_ASSESSMENT: 0-10
PAIN_FUNCTIONAL_ASSESSMENT: 0-10

## 2024-02-06 NOTE — ANESTHESIA PREPROCEDURE EVALUATION
Patient: Navya Clements    Procedure Information       Date/Time: 02/06/24 1300    Scheduled providers: Golden Vargas MD    Procedure: COLONOSCOPY    Location: [facility]            Relevant Problems   Anesthesia (within normal limits)      Cardiovascular   (+) Mixed hyperlipidemia      Neuro/Psych   (+) Mixed anxiety and depressive disorder      Musculoskeletal   (+) Lupus (CMS/HCC)      Other   (+) Arthritis       Clinical information reviewed:   Tobacco  Allergies  Meds   Med Hx  Surg Hx   Fam Hx  Soc Hx        NPO Detail:  NPO/Void Status  Carbohydrate Drink Given Prior to Surgery? : N  Date of Last Liquid: 02/06/24  Time of Last Liquid: 1000  Date of Last Solid: 02/04/24  Time of Last Solid: 1600  Last Intake Type: Clear fluids         Physical Exam    Airway  Mallampati: I     Cardiovascular - normal exam     Dental   (+) upper dentures     Pulmonary - normal exam     Abdominal          Anesthesia Plan    History of general anesthesia?: yes  History of complications of general anesthesia?: no    ASA 2     MAC     The patient is a current smoker.  Patient did not smoke on day of procedure.    Anesthetic plan and risks discussed with patient.  Use of blood products discussed with who consented to blood products.

## 2024-02-06 NOTE — H&P
History Of Present Illness  Navya Clements is a 68 y.o. female presenting for evaluation of chronic diarrhea consisting of 46 liquid bowel movements per day.  There is no associated abdominal pain bleeding or fever.  She has never had a colonoscopy examination..     Past Medical History  She has a past medical history of Hyperlipidemia.    Surgical History  She has no past surgical history on file.     Social History  She reports that she has been smoking cigarettes. She has been smoking an average of .5 packs per day. She has never used smokeless tobacco. She reports current alcohol use of about 2.0 standard drinks of alcohol per week. She reports that she does not use drugs.    Family History  No family history on file.     Allergies  Patient has no known allergies.    Review of Systems  Constitutional: no fever, no chills, fatigue,  not feeling tired and no recent weight loss. No reports of dizziness.  SKIN: No skin rash or lesions  EYE: No pain photophobia, diplopia or blurred vision  EAR: No discharge, pain or hearing loss  NOSE: No congestion, epistaxis or obstruction  RESPIRATORY: No cough , dyspnea or  chest pain   CV: No chest pain, lower extremity swelling or palpitations  GE: No abdominal pain, nausea, vomiting, dysphagia or odynophagia. Denied constipation , diarrhea  : No dysuria or hematuria, urinary incontinence or urine frequency  NEURO: Denied weakness, confusion, dizziness, or numbness   PSYCH : Denies anxiety, hallucinations or insomnia  HEME/ LYMPH : Denied bleeding , bruising or enlarged nodes  ENDOCRINE: No change in weight, polydipsia, or abnormal bleeding  .        Physical Exam  Head and neck examinations were  unremarkable. There was no temporal wasting. No jaundice noted. No thyromegaly.  No carotid bruits.  There is no peripheral lymphadenopathy.  Lungs were clear to auscultation. There when no rales, rhonchi or wheezes.  Cardiac examination revealed normal heart sounds. Rhythm was  "sinus . There were no murmurs.  Abdomen was full and soft. There was no organomegaly. Bowel sounds were normo- active. There was no ascites. The abdomen was nontender.  Rectal examination was not done.  Extremities: No edema or joint swelling.  Neurological examination: Patient was alert, oriented in person place, and time. There when no focal neurological signs. Cranial nerves were grossly intact. No evidence of encephalopathy. There was no asterixis. The  plantar response was flexor.    Last Recorded Vitals  Blood pressure (!) 143/91, pulse 58, temperature 36.1 °C (97 °F), temperature source Temporal, resp. rate 15, height 1.626 m (5' 4\"), weight 62.6 kg (138 lb), SpO2 98 %.    Relevant Results        None     Assessment/Plan  68-year-old lady with complaints of chronic diarrhea over the past several months.  There is no associated pain bleeding or fever.  Abdominal examination is benign.  Proceed with colonoscopy as planned.    Golden Vargas MD  "

## 2024-02-06 NOTE — ANESTHESIA POSTPROCEDURE EVALUATION
Patient: Navya Clements    Procedure Summary       Date: 02/06/24 Room / Location: [facility]    Anesthesia Start: 1345 Anesthesia Stop: 1424    Procedure: COLONOSCOPY Diagnosis: Diarrhea, unspecified type    Scheduled Providers: Golden Vargas MD Responsible Provider: SANTY Parsons    Anesthesia Type: MAC ASA Status: 2            Anesthesia Type: MAC    Vitals Value Taken Time   /86 02/06/24 1430   Temp 36.9 °C (98.4 °F) 02/06/24 1420   Pulse 60 02/06/24 1430   Resp 16 02/06/24 1430   SpO2 99 % 02/06/24 1430       Anesthesia Post Evaluation    Patient location during evaluation: bedside  Patient participation: complete - patient participated  Level of consciousness: awake  Pain management: adequate  Airway patency: patent  Cardiovascular status: acceptable  Respiratory status: acceptable  Hydration status: acceptable  Postoperative Nausea and Vomiting: none    There were no known notable events for this encounter.

## 2024-02-14 LAB
LABORATORY COMMENT REPORT: NORMAL
PATH REPORT.FINAL DX SPEC: NORMAL
PATH REPORT.GROSS SPEC: NORMAL
PATH REPORT.MICROSCOPIC SPEC OTHER STN: NORMAL
PATH REPORT.RELEVANT HX SPEC: NORMAL
PATH REPORT.TOTAL CANCER: NORMAL

## 2024-02-15 ENCOUNTER — OFFICE VISIT (OUTPATIENT)
Dept: PRIMARY CARE | Facility: CLINIC | Age: 69
End: 2024-02-15
Payer: MEDICARE

## 2024-02-15 VITALS
OXYGEN SATURATION: 98 % | DIASTOLIC BLOOD PRESSURE: 78 MMHG | TEMPERATURE: 97.6 F | HEART RATE: 64 BPM | BODY MASS INDEX: 23.69 KG/M2 | SYSTOLIC BLOOD PRESSURE: 124 MMHG | WEIGHT: 138 LBS

## 2024-02-15 DIAGNOSIS — R76.8 HEPATITIS C ANTIBODY TEST POSITIVE: ICD-10-CM

## 2024-02-15 DIAGNOSIS — B18.2 CHRONIC HEPATITIS C WITHOUT HEPATIC COMA (MULTI): ICD-10-CM

## 2024-02-15 DIAGNOSIS — Z11.59 NEED FOR HEPATITIS C SCREENING TEST: ICD-10-CM

## 2024-02-15 DIAGNOSIS — E78.2 MIXED HYPERLIPIDEMIA: Primary | ICD-10-CM

## 2024-02-15 DIAGNOSIS — E55.9 VITAMIN D DEFICIENCY: ICD-10-CM

## 2024-02-15 DIAGNOSIS — G62.9 NEUROPATHY: ICD-10-CM

## 2024-02-15 PROCEDURE — 99214 OFFICE O/P EST MOD 30 MIN: CPT | Performed by: FAMILY MEDICINE

## 2024-02-15 PROCEDURE — 1160F RVW MEDS BY RX/DR IN RCRD: CPT | Performed by: FAMILY MEDICINE

## 2024-02-15 PROCEDURE — 1126F AMNT PAIN NOTED NONE PRSNT: CPT | Performed by: FAMILY MEDICINE

## 2024-02-15 PROCEDURE — 1159F MED LIST DOCD IN RCRD: CPT | Performed by: FAMILY MEDICINE

## 2024-02-15 RX ORDER — ERGOCALCIFEROL 1.25 MG/1
50000 CAPSULE ORAL
Qty: 4 CAPSULE | Refills: 1 | Status: SHIPPED | OUTPATIENT
Start: 2024-02-15 | End: 2024-04-11

## 2024-02-15 ASSESSMENT — ENCOUNTER SYMPTOMS
POLYPHAGIA: 0
ABDOMINAL PAIN: 0
POLYDIPSIA: 0
MYALGIAS: 0
VOMITING: 0
NAUSEA: 0
SHORTNESS OF BREATH: 0
PALPITATIONS: 0
HEADACHES: 0
DIZZINESS: 0
CONSTIPATION: 0
ARTHRALGIAS: 0
DYSURIA: 0
BLOOD IN STOOL: 0
DIFFICULTY URINATING: 0
SLEEP DISTURBANCE: 0
FATIGUE: 0
DYSPHORIC MOOD: 0

## 2024-02-15 NOTE — PATIENT INSTRUCTIONS
Recommend a predominant low fat whole foods plant based diet.  Cut back on meat, dairy, processed carbs, salt and oils. Increase fiber in your diet.  Decrease alcohol as much as possible if you drink. Recommend regular exercise most days of the week(goal up to 150min per week). Also recommend good sleep habits aiming for 7-8 hours per night.     Start high dose vit D 50,000U 1x per week for 2 months, then switch to 1000U daily over the counter    Obtain repeat blood work tomorrow.     Repeat fasting blood work in 3 months.     Continue to avoid food triggers    Return in 3 months, sooner if needed    Follow up with your specialists as scheduled

## 2024-02-15 NOTE — PROGRESS NOTES
Subjective   Patient ID: Navya Clements is a 68 y.o. female who presents for Anxiety and Hyperlipidemia (Review BW) and multiple issues.     Anxiety  Patient reports no chest pain, dizziness, nausea, palpitations or shortness of breath.       Hyperlipidemia  Pertinent negatives include no chest pain, myalgias or shortness of breath.      Vitamin D: low 17    Lipids: high. HDL 62, , . Was on statin in past.     Hep C: +6 mo ago but PCR neg. Reports 10-20 partners in past. No h/o IVDU/bld transfusion. Has 1 tattoo done at reputable place. GI apt in may    Hep B: positive abs. Pt does not recall vaccine.     Diarrhea sig improving w/ lactose free diet. Saw GI. Told to follow PCP recommendations since improving. Having formed stool    Review of Systems   Constitutional:  Negative for fatigue.   Eyes:  Negative for visual disturbance.   Respiratory:  Negative for shortness of breath.    Cardiovascular:  Negative for chest pain and palpitations.   Gastrointestinal:  Negative for abdominal pain, blood in stool, constipation, nausea and vomiting.   Endocrine: Negative for cold intolerance, heat intolerance, polydipsia, polyphagia and polyuria.   Genitourinary:  Negative for difficulty urinating and dysuria.   Musculoskeletal:  Negative for arthralgias and myalgias.   Skin:  Negative for rash.   Neurological:  Negative for dizziness and headaches.   Psychiatric/Behavioral:  Negative for dysphoric mood and sleep disturbance.        Objective   /78   Pulse 64   Temp 36.4 °C (97.6 °F)   Wt 62.6 kg (138 lb)   SpO2 98%   BMI 23.69 kg/m²     Physical Exam  Vitals and nursing note reviewed.   Constitutional:       General: She is not in acute distress.     Appearance: Normal appearance. She is not toxic-appearing.   HENT:      Head: Normocephalic.      Mouth/Throat:      Pharynx: Oropharynx is clear.   Eyes:      General: No scleral icterus.     Pupils: Pupils are equal, round, and reactive to light.    Neck:      Vascular: No carotid bruit.   Cardiovascular:      Rate and Rhythm: Normal rate and regular rhythm.      Pulses: Normal pulses.      Heart sounds: No murmur heard.  Pulmonary:      Effort: Pulmonary effort is normal. No respiratory distress.      Breath sounds: Normal breath sounds.   Abdominal:      General: Bowel sounds are normal.      Palpations: Abdomen is soft.      Tenderness: There is no abdominal tenderness. There is no guarding.   Musculoskeletal:         General: No tenderness.      Right lower leg: No edema.      Left lower leg: No edema.   Skin:     General: Skin is warm.   Neurological:      General: No focal deficit present.      Mental Status: She is alert.      Cranial Nerves: No cranial nerve deficit.   Psychiatric:         Mood and Affect: Mood normal.         Assessment/Plan   Problem List Items Addressed This Visit             ICD-10-CM    Mixed hyperlipidemia - Primary E78.2    Relevant Orders    Comprehensive Metabolic Panel    Lipid Panel    Neuropathy G62.9    Vitamin D deficiency E55.9    Relevant Medications    ergocalciferol (Vitamin D-2) 1.25 MG (24212 UT) capsule    Other Relevant Orders    Vitamin D 25-Hydroxy,Total (for eval of Vitamin D levels)     Other Visit Diagnoses         Codes    Hepatitis C antibody test positive     R76.8    Relevant Orders    Hepatitis C Antibody    Hepatitis B surface antigen    Hepatitis B core antibody, total    Alpha-Fetoprotein    HCV PCR with Genotype Reflex    Need for hepatitis C screening test     Z11.59    Relevant Orders    Hepatitis C Antibody    Chronic hepatitis C without hepatic coma (CMS/HCC)     B18.2    Relevant Orders    Alpha-Fetoprotein          Discussed bw. Recommendations given

## 2024-02-16 ENCOUNTER — LAB (OUTPATIENT)
Dept: LAB | Facility: LAB | Age: 69
End: 2024-02-16
Payer: MEDICARE

## 2024-02-16 ENCOUNTER — HOSPITAL ENCOUNTER (OUTPATIENT)
Dept: RADIOLOGY | Facility: HOSPITAL | Age: 69
Discharge: HOME | End: 2024-02-16
Payer: MEDICARE

## 2024-02-16 DIAGNOSIS — R76.8 HEPATITIS C ANTIBODY TEST POSITIVE: ICD-10-CM

## 2024-02-16 DIAGNOSIS — B18.2 CHRONIC HEPATITIS C WITHOUT HEPATIC COMA (MULTI): ICD-10-CM

## 2024-02-16 DIAGNOSIS — C50.912 MALIGNANT NEOPLASM OF LEFT FEMALE BREAST, UNSPECIFIED ESTROGEN RECEPTOR STATUS, UNSPECIFIED SITE OF BREAST (MULTI): ICD-10-CM

## 2024-02-16 DIAGNOSIS — Z11.59 NEED FOR HEPATITIS C SCREENING TEST: ICD-10-CM

## 2024-02-16 DIAGNOSIS — Z12.31 ENCOUNTER FOR SCREENING MAMMOGRAM FOR MALIGNANT NEOPLASM OF BREAST: ICD-10-CM

## 2024-02-16 DIAGNOSIS — E55.9 VITAMIN D DEFICIENCY: ICD-10-CM

## 2024-02-16 LAB
25(OH)D3 SERPL-MCNC: 15 NG/ML (ref 30–100)
AFP SERPL-MCNC: 6 NG/ML (ref 0–9)
HBV CORE AB SER QL: REACTIVE
HBV SURFACE AG SERPL QL IA: NONREACTIVE
HCV AB SER QL: REACTIVE

## 2024-02-16 PROCEDURE — 86704 HEP B CORE ANTIBODY TOTAL: CPT

## 2024-02-16 PROCEDURE — 77067 SCR MAMMO BI INCL CAD: CPT | Mod: RT

## 2024-02-16 PROCEDURE — 82105 ALPHA-FETOPROTEIN SERUM: CPT

## 2024-02-16 PROCEDURE — 86803 HEPATITIS C AB TEST: CPT

## 2024-02-16 PROCEDURE — 77067 SCR MAMMO BI INCL CAD: CPT | Mod: RIGHT SIDE | Performed by: RADIOLOGY

## 2024-02-16 PROCEDURE — 82306 VITAMIN D 25 HYDROXY: CPT

## 2024-02-16 PROCEDURE — 87521 HEPATITIS C PROBE&RVRS TRNSC: CPT

## 2024-02-16 PROCEDURE — 36415 COLL VENOUS BLD VENIPUNCTURE: CPT

## 2024-02-16 PROCEDURE — 77063 BREAST TOMOSYNTHESIS BI: CPT | Mod: RIGHT SIDE | Performed by: RADIOLOGY

## 2024-02-16 PROCEDURE — 87340 HEPATITIS B SURFACE AG IA: CPT

## 2024-02-18 LAB
HCV RNA SERPL NAA+PROBE-ACNC: NOT DETECTED K[IU]/ML
HCV RNA SERPL NAA+PROBE-LOG IU: NORMAL {LOG_IU}/ML
LABORATORY COMMENT REPORT: NORMAL

## 2024-02-20 ENCOUNTER — OFFICE VISIT (OUTPATIENT)
Dept: SURGERY | Facility: CLINIC | Age: 69
End: 2024-02-20
Payer: MEDICARE

## 2024-02-20 ENCOUNTER — HOSPITAL ENCOUNTER (OUTPATIENT)
Dept: RADIOLOGY | Facility: EXTERNAL LOCATION | Age: 69
Discharge: HOME | End: 2024-02-20

## 2024-02-20 VITALS
HEART RATE: 71 BPM | HEIGHT: 64 IN | OXYGEN SATURATION: 95 % | SYSTOLIC BLOOD PRESSURE: 144 MMHG | DIASTOLIC BLOOD PRESSURE: 99 MMHG | BODY MASS INDEX: 23.52 KG/M2 | WEIGHT: 137.8 LBS

## 2024-02-20 DIAGNOSIS — Z95.828 PORT-A-CATH IN PLACE: ICD-10-CM

## 2024-02-20 PROCEDURE — 1160F RVW MEDS BY RX/DR IN RCRD: CPT | Performed by: SURGERY

## 2024-02-20 PROCEDURE — 1126F AMNT PAIN NOTED NONE PRSNT: CPT | Performed by: SURGERY

## 2024-02-20 PROCEDURE — 1159F MED LIST DOCD IN RCRD: CPT | Performed by: SURGERY

## 2024-02-20 PROCEDURE — 99203 OFFICE O/P NEW LOW 30 MIN: CPT | Performed by: SURGERY

## 2024-02-20 ASSESSMENT — ENCOUNTER SYMPTOMS
CONFUSION: 0
VOMITING: 0
CONSTIPATION: 0
POLYPHAGIA: 0
CHILLS: 0
FLANK PAIN: 0
SHORTNESS OF BREATH: 0
EYE REDNESS: 0
NAUSEA: 0
FREQUENCY: 0
MYALGIAS: 0
WEAKNESS: 0
DIARRHEA: 0
SPEECH DIFFICULTY: 0
WOUND: 0
BRUISES/BLEEDS EASILY: 0
EYE PAIN: 0
FATIGUE: 0
AGITATION: 0
HEADACHES: 0
ABDOMINAL PAIN: 0
HEMATURIA: 0
ARTHRALGIAS: 0
FEVER: 0
COUGH: 0
DYSURIA: 0

## 2024-02-20 NOTE — LETTER
February 20, 2024     Iveth Johnson DO  [address] [place] [id number]    Patient: Navya Clements   YOB: 1955   Date of Visit: 2/20/2024       Dear Dr. Iveth Johnson DO:    Thank you for referring Navya Clements to me for evaluation. Below are my notes for this consultation.  If you have questions, please do not hesitate to call me. I look forward to following your patient along with you.       Sincerely,     Leonie Hemphill MD      CC: No Recipients  ______________________________________________________________________________________        GENERAL SURGERY OFFICE NOTE    Patient: Navya Clements    Age: 68 y.o.   Gender: female    MRN: [id number]    PCP: Iveth Johnson DO        SUBJECTIVE     Chief Complaint  New Patient Visit (Patient is here by self referral for port removal. Patient states the last time her port was accessed in December 2022. Patient was treated for breast cancer in [place]. She moved to [place] 5/26/23.)       SILVANO Mendosa is a 68-year-old white female who I am seeing in consultation at the request of her primary care physician for evaluation for a Port-A-Cath removal.  The patient had received treatment for triple negative left breast cancer in [place].  She had undergone a left mastectomy with sentinel lymph node biopsy (7 lymph nodes removed) followed by adjuvant chemotherapy and XRT.  The last time the right IJ Port-A-Cath was used was December 2022 (14 months ago).  She currently denies any problems with the port.  She gets her blood drawn and sent back to her oncologist in [place] every other month.  She reports undergoing genetic testing and was negative for the BRCA gene.  She had a right mammogram last week.  Report is pending review of prior mammograms.  She is requesting a Port-A-Cath removal.    ROS  Review of Systems   Constitutional:  Negative for chills, fatigue and fever.   HENT:  Negative for congestion, ear pain and hearing loss.   "  Eyes:  Negative for pain and redness.   Respiratory:  Negative for cough and shortness of breath.    Cardiovascular:  Negative for chest pain and leg swelling.   Gastrointestinal:  Negative for abdominal pain, constipation, diarrhea, nausea and vomiting.   Endocrine: Negative for polyphagia.   Genitourinary:  Negative for dysuria, flank pain, frequency and hematuria.   Musculoskeletal:  Negative for arthralgias and myalgias.   Skin:  Negative for rash and wound.   Allergic/Immunologic: Negative for immunocompromised state.   Neurological:  Negative for speech difficulty, weakness and headaches.   Hematological:  Does not bruise/bleed easily.   Psychiatric/Behavioral:  Negative for agitation and confusion.           HISTORY     Past Medical History:   Diagnosis Date   • Hx antineoplastic chemo    • Hyperlipidemia    • Personal history of irradiation         Past Surgical History:   Procedure Laterality Date   • BREAST BIOPSY Right    • MASTECTOMY Left         No Known Allergies     Social History     Tobacco Use   Smoking Status Every Day   • Packs/day: .5   • Types: Cigarettes   Smokeless Tobacco Never        Social History     Substance and Sexual Activity   Alcohol Use Yes   • Alcohol/week: 2.0 standard drinks of alcohol   • Types: 2 Standard drinks or equivalent per week    Comment: occasionally        HOME MEDICATIONS  Current Outpatient Medications   Medication Instructions   • DULoxetine (CYMBALTA) 30 mg, oral, Daily   • ergocalciferol (VITAMIN D-2) 50,000 Units, oral, Weekly   • fluticasone (Flonase) 50 mcg/actuation nasal spray 1 spray, Each Nostril, Daily, Shake gently. Before first use, prime pump. After use, clean tip and replace cap.   • gabapentin (NEURONTIN) 300 mg, oral, 3 times daily   • traZODone (DESYREL) 50 mg, oral, Nightly          OBJECTIVE   Last Recorded Vitals.  Blood pressure (!) 144/99, pulse 71, height 1.626 m (5' 4\"), weight 62.5 kg (137 lb 12.8 oz), SpO2 95 %.     PHYSICAL " EXAM  Physical Exam   General: Well-developed, well-nourished and in no acute distress.  Head: Normocephalic. Atraumatic.  Neck/thyroid: Neck is supple.   Eyes: Pupils equal round and reactive to light. Conjunctiva normal.  ENMT: No masses or deformity of external nose. External ears without masses.  Respiratory/Chest:  Normal respiratory effort.  Port-A-Cath in the subclavian position with visible catheter under the skin up to the right IJ.  Breast: s/p left mastectomy.  No palpable masses or skin lesions.  Right breast is moderate sized.  No predominant masses.  No skin changes or nipple discharge.  Lymphatics: No palpable lymphadenopathy of the cervical, supraclavicular or axillary regions.  Cardiovascular: Regular rate and rhythm.   Abdomen: Soft, nontender, nondistended.   Musculoskeletal: Joints and limbs are grossly normal. Normal gait. Normal range of motion of major joints.  Neuro: Oriented to person, place and time. No obvious neurological deficit. Motor strength grossly normal.  Psych: Normal mood and affect.    RESULTS   Labs  No results found for this or any previous visit (from the past 24 hour(s)).    Radiology Resut  Right mammogram report 2/16/2024 is pending.      ASSESSMENT / PLAN   Diagnoses and all orders for this visit:  Port-A-Cath in place  -     Referral to General Surgery      Plan  March 2021: LEFT; triple neg Stg2 breast cancer; s/p mastectomy with SLNB (7LNs); chemo; XRT    1.  With regards to her breast cancer, we will try to obtain her old records.       [facility] in [place], [place] [phone]       Dr. Gates (oncology) at [facility] [phone]  2.  By physical exam, she has no evidence of recurrent cancer on the left.  No palpable abnormalities on the right.  She recently had a right mammogram, but the report is pending review of prior mammograms from [place].  Will continue on yearly screening mammograms on the right.  3.  Reviewed that the  Port-A-Cath can be removed as an office procedure under local anesthetic.  She will return to the office in the next few weeks to have this performed.  4.  She states that she has blood drawn every 6 to 8 weeks and sent to her oncologist in [place].      Leonie Hemphill MD, FACS   [facility]  [address] [place] [address];   [address]  [place] [id number] [phone]

## 2024-02-20 NOTE — PROGRESS NOTES
GENERAL SURGERY OFFICE NOTE    Patient: Navya Clements    Age: 68 y.o.   Gender: female    MRN: [id number]    PCP: Iveth Johnson DO        SUBJECTIVE     Chief Complaint  New Patient Visit (Patient is here by self referral for port removal. Patient states the last time her port was accessed in December 2022. Patient was treated for breast cancer in [place]. She moved to [place] 5/26/23.)       SILVANO Mendosa is a 68-year-old white female who I am seeing in consultation at the request of her primary care physician for evaluation for a Port-A-Cath removal.  The patient had received treatment for triple negative left breast cancer in [place].  She had undergone a left mastectomy with sentinel lymph node biopsy (7 lymph nodes removed) followed by adjuvant chemotherapy and XRT.  The last time the right IJ Port-A-Cath was used was December 2022 (14 months ago).  She currently denies any problems with the port.  She gets her blood drawn and sent back to her oncologist in [place] every other month.  She reports undergoing genetic testing and was negative for the BRCA gene.  She had a right mammogram last week.  Report is pending review of prior mammograms.  She is requesting a Port-A-Cath removal.    ROS  Review of Systems   Constitutional:  Negative for chills, fatigue and fever.   HENT:  Negative for congestion, ear pain and hearing loss.    Eyes:  Negative for pain and redness.   Respiratory:  Negative for cough and shortness of breath.    Cardiovascular:  Negative for chest pain and leg swelling.   Gastrointestinal:  Negative for abdominal pain, constipation, diarrhea, nausea and vomiting.   Endocrine: Negative for polyphagia.   Genitourinary:  Negative for dysuria, flank pain, frequency and hematuria.   Musculoskeletal:  Negative for arthralgias and myalgias.   Skin:  Negative for rash and wound.   Allergic/Immunologic: Negative for immunocompromised state.   Neurological:  Negative for speech difficulty, weakness and  "headaches.   Hematological:  Does not bruise/bleed easily.   Psychiatric/Behavioral:  Negative for agitation and confusion.           HISTORY     Past Medical History:   Diagnosis Date   • Hx antineoplastic chemo    • Hyperlipidemia    • Personal history of irradiation         Past Surgical History:   Procedure Laterality Date   • BREAST BIOPSY Right    • MASTECTOMY Left         No Known Allergies     Social History     Tobacco Use   Smoking Status Every Day   • Packs/day: .5   • Types: Cigarettes   Smokeless Tobacco Never        Social History     Substance and Sexual Activity   Alcohol Use Yes   • Alcohol/week: 2.0 standard drinks of alcohol   • Types: 2 Standard drinks or equivalent per week    Comment: occasionally        HOME MEDICATIONS  Current Outpatient Medications   Medication Instructions   • DULoxetine (CYMBALTA) 30 mg, oral, Daily   • ergocalciferol (VITAMIN D-2) 50,000 Units, oral, Weekly   • fluticasone (Flonase) 50 mcg/actuation nasal spray 1 spray, Each Nostril, Daily, Shake gently. Before first use, prime pump. After use, clean tip and replace cap.   • gabapentin (NEURONTIN) 300 mg, oral, 3 times daily   • traZODone (DESYREL) 50 mg, oral, Nightly          OBJECTIVE   Last Recorded Vitals.  Blood pressure (!) 144/99, pulse 71, height 1.626 m (5' 4\"), weight 62.5 kg (137 lb 12.8 oz), SpO2 95 %.     PHYSICAL EXAM  Physical Exam   General: Well-developed, well-nourished and in no acute distress.  Head: Normocephalic. Atraumatic.  Neck/thyroid: Neck is supple.   Eyes: Pupils equal round and reactive to light. Conjunctiva normal.  ENMT: No masses or deformity of external nose. External ears without masses.  Respiratory/Chest:  Normal respiratory effort.  Port-A-Cath in the subclavian position with visible catheter under the skin up to the right IJ.  Breast: s/p left mastectomy.  No palpable masses or skin lesions.  Right breast is moderate sized.  No predominant masses.  No skin changes or nipple " discharge.  Lymphatics: No palpable lymphadenopathy of the cervical, supraclavicular or axillary regions.  Cardiovascular: Regular rate and rhythm.   Abdomen: Soft, nontender, nondistended.   Musculoskeletal: Joints and limbs are grossly normal. Normal gait. Normal range of motion of major joints.  Neuro: Oriented to person, place and time. No obvious neurological deficit. Motor strength grossly normal.  Psych: Normal mood and affect.    RESULTS   Labs  No results found for this or any previous visit (from the past 24 hour(s)).    Radiology Resutls  Right mammogram report 2/16/2024 is pending.      ASSESSMENT / PLAN   Diagnoses and all orders for this visit:  Port-A-Cath in place  -     Referral to General Surgery      Plan  March 2021: LEFT; triple neg Stg2 breast cancer; s/p mastectomy with SLNB (7LNs); chemo; XRT    1.  With regards to her breast cancer, we will try to obtain her old records.       [facility] in [place], [place] [phone]       Dr. Gates (oncology) at [facility] [phone]  2.  By physical exam, she has no evidence of recurrent cancer on the left.  No palpable abnormalities on the right.  She recently had a right mammogram, but the report is pending review of prior mammograms from [place].  Will continue on yearly screening mammograms on the right.  3.  Reviewed that the Port-A-Cath can be removed as an office procedure under local anesthetic.  She will return to the office in the next few weeks to have this performed.  4.  She states that she has blood drawn every 6 to 8 weeks and sent to her oncologist in [place].      Leonie Hemphill MD, FACS  [facility]  [address] [place] [address];   [address]  [place]  [address] [phone]

## 2024-02-20 NOTE — PATIENT INSTRUCTIONS
1.  Return to Dr. Hemphill's office to have your Port-A-Cath removed as an office procedure under local anesthetic.  You may bring 1 person with you to the appointment.  2.  Dr. Hemphill's office will try to obtain your cancer records from your previous hospital/oncologist.  3.  There is not a report on your mammogram from 2/16/2024.  Dr. Hemphill can discuss this with you at your next appointment when you have your Port-A-Cath removed.

## 2024-02-26 ENCOUNTER — OFFICE VISIT (OUTPATIENT)
Dept: GASTROENTEROLOGY | Facility: CLINIC | Age: 69
End: 2024-02-26
Payer: MEDICARE

## 2024-02-26 VITALS
OXYGEN SATURATION: 93 % | SYSTOLIC BLOOD PRESSURE: 109 MMHG | HEART RATE: 94 BPM | DIASTOLIC BLOOD PRESSURE: 77 MMHG | BODY MASS INDEX: 23.56 KG/M2 | HEIGHT: 64 IN | WEIGHT: 138 LBS

## 2024-02-26 DIAGNOSIS — K52.832 LYMPHOCYTIC COLITIS: Primary | ICD-10-CM

## 2024-02-26 PROBLEM — R76.8 HEPATITIS C ANTIBODY TEST POSITIVE: Status: ACTIVE | Noted: 2024-02-16

## 2024-02-26 PROCEDURE — 1160F RVW MEDS BY RX/DR IN RCRD: CPT | Performed by: INTERNAL MEDICINE

## 2024-02-26 PROCEDURE — 99213 OFFICE O/P EST LOW 20 MIN: CPT | Performed by: INTERNAL MEDICINE

## 2024-02-26 PROCEDURE — 1159F MED LIST DOCD IN RCRD: CPT | Performed by: INTERNAL MEDICINE

## 2024-02-26 PROCEDURE — 1126F AMNT PAIN NOTED NONE PRSNT: CPT | Performed by: INTERNAL MEDICINE

## 2024-02-26 RX ORDER — BUDESONIDE 3 MG/1
CAPSULE, COATED PELLETS ORAL
Qty: 210 CAPSULE | Refills: 0 | Status: SHIPPED | OUTPATIENT
Start: 2024-02-26 | End: 2024-06-04

## 2024-02-26 ASSESSMENT — ENCOUNTER SYMPTOMS
DIZZINESS: 0
COUGH: 0
SHORTNESS OF BREATH: 0
SORE THROAT: 0
ROS GI COMMENTS: AS DETAILED ABOVE.
HEMATURIA: 0
PALPITATIONS: 0
DYSURIA: 0
HEADACHES: 0
CONFUSION: 0
BRUISES/BLEEDS EASILY: 0
SEIZURES: 0
ADENOPATHY: 0
NERVOUS/ANXIOUS: 0
UNEXPECTED WEIGHT CHANGE: 0
VOICE CHANGE: 0
MYALGIAS: 0
CHILLS: 0
WEAKNESS: 0
FATIGUE: 0
TROUBLE SWALLOWING: 0
ARTHRALGIAS: 0
FEVER: 0
WHEEZING: 0
NUMBNESS: 0

## 2024-02-26 NOTE — PROGRESS NOTES
[facility]    ASSESSMENT and PLAN:       Navya Clements is a 68 y.o. female with a significant past medical history of HLD and breast cancer who presents for follow up of diarrhea/lymphocytic colitis.       Lymphocytic colitis  Evidence of microscopic (lymphocytic) colitis on pathology from recent colonoscopy. Labs to evaluate other causes such as celiac and EPI unremarkable. She is on Duloxetine which has been linked to microscopic colitis in the past. Will treat with Budesonide. Some improvement with lactose free diet as well.  - follow up with PCP to consider alternative to Duloxetine which has been linked to development of microscopic colitis in the past  - Budesonide for 8 weeks followed by taper  - PRN imodium for diarrhea        Follow up in 8 weeks.        Rajendra Hutton MD        Gastroenterology    [facility] [place] [facility]    Clinical   [facility]        Subjective   HISTORY OF PRESENT ILLNESS:     Chief Complaint  Follow-up (Colonoscopy follow up)    History Of Present Illness:    Navya Clements is a 68 y.o. female with a significant past medical history of HLD and breast cancer who presents for follow up of diarrhea.    she follows with (Iveth Johnson DO) as her PCP.     There is an O&P from November 2023 that was negative. In August 2023 she did have Hep C testing through [facility] which showed a positive Ab with negative viral load consistent with spontaneous clearance of a previous infection or false positive Ab result.    I had initially seen her in January 2024 for diarrhea at which time I had ordered labs that showed a normal TTG, normal TSH, normal CRP, normal stool pathogen panel, elevated calprotectin, and normal fecal elastase.    Colonoscopy on 2/6/2024 a small cecal angioectasia as well as internal hemorrhoids. Random biopsies at that time showed evidence of lymphocytic  colitis.      Since her colonoscopy she feels like things have improved. She has started following a dairy free diet. With that she is now having 2-3 soft BMs per day that are occasionally loose. No blood in her stool and no abdominal pain. Some bloating at times. She has not needed to take Imodium.      Patient denies any heartburn/GERD, N/V, dysphagia, odynophagia, constipation, hematemesis, hematochezia, melena, or weight loss.         Review of systems:   Review of Systems   Constitutional:  Negative for chills, fatigue, fever and unexpected weight change.   HENT:  Negative for congestion, sneezing, sore throat, trouble swallowing and voice change.    Respiratory:  Negative for cough, shortness of breath and wheezing.    Cardiovascular:  Negative for chest pain, palpitations and leg swelling.   Gastrointestinal:         As detailed above.   Genitourinary:  Negative for dysuria and hematuria.   Musculoskeletal:  Negative for arthralgias and myalgias.   Skin:  Negative for pallor and rash.   Neurological:  Negative for dizziness, seizures, syncope, weakness, numbness and headaches.   Hematological:  Negative for adenopathy. Does not bruise/bleed easily.   Psychiatric/Behavioral:  Negative for confusion. The patient is not nervous/anxious.    All other systems reviewed and are negative.      I performed a complete 10 point review of systems and it is negative except as noted in HPI or above.      PAST HISTORIES:       Past Medical History:  She has a past medical history of antineoplastic chemo, Hyperlipidemia, and Personal history of irradiation.    Past Surgical History:  She has a past surgical history that includes Mastectomy (Left) and Breast biopsy (Right).      Social History:  She reports that she has been smoking cigarettes. She has been smoking an average of .5 packs per day. She has never used smokeless tobacco. She reports current alcohol use of about 2.0 standard drinks of alcohol per week. She reports  "that she does not use drugs.    Family History:  No known GI disease, specifically denies pancreatitis, Crohn's, colon cancer, gastroesophageal cancer, or ulcerative colitis.    Family History   Problem Relation Name Age of Onset   • Breast cancer Paternal Grandmother          Allergies:  Patient has no known allergies.      Objective   OBJECTIVE:       Last Recorded Vitals:  Vitals:    02/26/24 1530   BP: 109/77   Pulse: 94   SpO2: 93%   Weight: 62.6 kg (138 lb)   Height: 1.626 m (5' 4\")     /77   Pulse 94   Ht 1.626 m (5' 4\")   Wt 62.6 kg (138 lb)   SpO2 93%   BMI 23.69 kg/m²      Physical Exam:    Physical Exam  Vitals reviewed.   Constitutional:       General: She is not in acute distress.     Appearance: She is not ill-appearing.   HENT:      Head: Normocephalic and atraumatic.   Eyes:      General: No scleral icterus.  Cardiovascular:      Rate and Rhythm: Normal rate and regular rhythm.      Pulses: Normal pulses.      Heart sounds: Normal heart sounds. No murmur heard.  Pulmonary:      Effort: Pulmonary effort is normal. No respiratory distress.      Breath sounds: Normal breath sounds. No wheezing.   Abdominal:      General: Bowel sounds are normal.      Palpations: Abdomen is soft.      Tenderness: There is no abdominal tenderness. There is no rebound.   Musculoskeletal:         General: No swelling or deformity.   Skin:     General: Skin is warm and dry.      Coloration: Skin is not jaundiced.      Findings: No rash.   Neurological:      General: No focal deficit present.      Mental Status: She is alert and oriented to person, place, and time.   Psychiatric:         Mood and Affect: Mood normal.         Behavior: Behavior normal.         Thought Content: Thought content normal.         Judgment: Judgment normal.         Home Medications:  Prior to Admission medications    Medication Sig Start Date End Date Taking? Authorizing Provider   DULoxetine (Cymbalta) 30 mg DR capsule Take 1 capsule (30 " "mg) by mouth once daily. 1/31/24 4/30/24  Lynne Jackson DO   ergocalciferol (Vitamin D-2) 1.25 MG (30633 UT) capsule Take 1 capsule (50,000 Units) by mouth 1 (one) time per week. 2/15/24 4/11/24  Lynne Jackson DO   fluticasone (Flonase) 50 mcg/actuation nasal spray Administer 1 spray into each nostril once daily. Shake gently. Before first use, prime pump. After use, clean tip and replace cap. 1/31/24 1/30/25  Lynne Jackson DO   gabapentin (Neurontin) 300 mg capsule Take 1 capsule (300 mg) by mouth 3 times a day. 1/31/24 4/30/24  Lynne Jackson DO   traZODone (Desyrel) 50 mg tablet Take 1 tablet (50 mg) by mouth once daily at bedtime. 1/31/24 4/30/24  Lynne Jackson DO         Relevant Results Recent labs reviewed in the EMR.    No results found for: \"WBC\", \"HGB\", \"MCV\", \"PLT\", \"IRON\", \"TIBC\", \"IRONSAT\", \"FERRITIN\", \"[id number]\", \"FOLATE\"    Lab Results   Component Value Date/Time     01/31/2024 1338    K 3.9 01/31/2024 1338     (H) 01/31/2024 1338    BUN 12 01/31/2024 1338    CREATININE 0.78 01/31/2024 1338       Lab Results   Component Value Date/Time    BILITOT 1.0 01/31/2024 1338    ALKPHOS 66 01/31/2024 1338    AST 13 01/31/2024 1338    ALT 13 01/31/2024 1338       Lab Results   Component Value Date/Time    CRP <0.10 01/22/2024 1351    CALPS 712 (H) 01/31/2024 1338       Radiology: Imaging reviewed in the EMR.  BI mammo right screening tomosynthesis    Result Date: 2/21/2024  Interpreted By:  Fidel Baum, STUDY: BI MAMMO RIGHT SCREENING TOMOSYNTHESIS;  2/16/2024 2:13 pm   ACCESSION NUMBER(S): [id number]   ORDERING CLINICIAN: LYNNE JACKSON   INDICATION: Signs/Symptoms:Screening.   COMPARISON: 08/15/2023   FINDINGS: 2D and tomosynthesis images of the right breast were reviewed at 1 mm slice thickness.   Density:  The breast tissue is heterogeneously dense, which may obscure small masses.   No suspicious masses or calcifications are identified.   This study was interpreted with CAD. " Markers: [place]- skin lesion; triangle- palpable abnormality       No mammographic evidence of malignancy.   BI-RADS CATEGORY:   BI-RADS Category:  1 Negative. Recommendation:  Routine Screening Mammogram in 1 Year. Recommended Date:  1 Year. Laterality:  Bilateral.   For any future breast imaging appointments, please call [phone] ([phone]).     MACRO: None   Signed by: Ziyadsandy Sarika 2/21/2024 9:13 AM Dictation workstation:   [id number]    BI transfer of outside films    Result Date: 2/20/2024  Outside images for comparison or treatment purposes, not interpreted by  Radiologists.    BI transfer of outside films    Result Date: 2/20/2024  Outside images for comparison or treatment purposes, not interpreted by  Radiologists.    Colonoscopy Diagnostic    Result Date: 2/6/2024  Table formatting from the original result was not included. Impression Single small angioectasia in the cecum; induced coagulation and hemostasis achieved with argon plasma coagulation 2 medium, flat (grade 2) hemorrhoids; bleeding was observed Findings Single small angioectasia in the cecum; no bleeding was identified; induced coagulation and hemostasis achieved with 2 applications of with argon plasma coagulation Two left lateral and posterior internal medium, flat (grade 2) hemorrhoids observed during retroflexion; bleeding was observed. Random biopsies of Ascending  /  Descending Colon .  R/ O Microscopic colitis  Recommendation  Await pathology results  Follow up with PCP  Follow up Dr conklin : 2 weeks.  Indication Diarrhea, unspecified type Staff Staff Role Golden Vargas MD Proceduralist Medications See Anesthesia Record. Preprocedure A history and physical has been performed, and patient medication allergies have been reviewed. The patient's tolerance of previous anesthesia has been reviewed. The risks and benefits of the procedure and the sedation options and risks were discussed with the patient. All questions were answered and  informed consent obtained. Details of the Procedure The patient underwent monitored anesthesia care, which was administered by an anesthesia professional. The patient's blood pressure, ECG, ETCO2, heart rate, level of consciousness, oxygen and respirations were monitored throughout the procedure. A digital rectal exam was performed. A perianal exam was performed. The scope was introduced through the anus and advanced to the terminal ileum. Retroflexion was performed in the rectum. The quality of bowel preparation was evaluated using the [place] Bowel Preparation Scale with scores of: right colon = 3, transverse colon = 3, left colon = 3. The total BBPS score was 9. Bowel prep was adequate. The patient experienced no blood loss. The procedure was not difficult. The patient tolerated the procedure well. There were no apparent adverse events. Events Procedure Events Event Event Time ENDO SCOPE IN TIME 2/6/2024  1:57 PM ENDO CECUM REACHED 2/6/2024  2:03 PM ENDO SCOPE OUT TIME 2/6/2024  2:15 PM Specimens ID Type Source Tests Collected by Time 1 :  Tissue ASCENDING COLON BIOPSY SURGICAL PATHOLOGY EXAM Golden Vargas MD 2/6/2024 1407 2 :  Tissue COLON - DESCENDING BIOPSY SURGICAL PATHOLOGY EXAM Golden Vargas MD 2/6/2024 1412 Procedure Location [facility] [address] [place] [id number] [phone] Referring Provider Iveth Johnson DO [address] [address] Procedure Provider Golden Vargas MD

## 2024-02-26 NOTE — LETTER
February 26, 2024     Iveth Johnson DO  [address]  [address] [address]    Patient: Navya Clements   YOB: 1955   Date of Visit: 2/26/2024       Dear Dr. Iveth Johnson DO:    Thank you for referring Navya Clements to me for evaluation. Below are my notes for this consultation.  If you have questions, please do not hesitate to call me. I look forward to following your patient along with you.       Sincerely,     Rajendra Hutton MD      CC: No Recipients  ______________________________________________________________________________________        [facility]    ASSESSMENT and PLAN:       Navya Clements is a 68 y.o. female with a significant past medical history of HLD and breast cancer who presents for follow up of diarrhea/lymphocytic colitis.       Lymphocytic colitis  Evidence of microscopic (lymphocytic) colitis on pathology from recent colonoscopy. Labs to evaluate other causes such as celiac and EPI unremarkable. She is on Duloxetine which has been linked to microscopic colitis in the past. Will treat with Budesonide. Some improvement with lactose free diet as well.  - follow up with PCP to consider alternative to Duloxetine which has been linked to development of microscopic colitis in the past  - Budesonide for 8 weeks followed by taper  - PRN imodium for diarrhea        Follow up in 8 weeks.        Rajendra Hutton MD        Gastroenterology    [facility]    Clinical   [facility]        Subjective   HISTORY OF PRESENT ILLNESS:     Chief Complaint  Follow-up (Colonoscopy follow up)    History Of Present Illness:    Navya Clements is a 68 y.o. female with a significant past medical history of HLD and breast cancer who presents for follow up of diarrhea.    she follows with (Iveth Johnson DO) as her PCP.     There is an O&P from November 2023 that was  negative. In August 2023 she did have Hep C testing through [facility] which showed a positive Ab with negative viral load consistent with spontaneous clearance of a previous infection or false positive Ab result.    I had initially seen her in January 2024 for diarrhea at which time I had ordered labs that showed a normal TTG, normal TSH, normal CRP, normal stool pathogen panel, elevated calprotectin, and normal fecal elastase.    Colonoscopy on 2/6/2024 a small cecal angioectasia as well as internal hemorrhoids. Random biopsies at that time showed evidence of lymphocytic colitis.      Since her colonoscopy she feels like things have improved. She has started following a dairy free diet. With that she is now having 2-3 soft BMs per day that are occasionally loose. No blood in her stool and no abdominal pain. Some bloating at times. She has not needed to take Imodium.      Patient denies any heartburn/GERD, N/V, dysphagia, odynophagia, constipation, hematemesis, hematochezia, melena, or weight loss.         Review of systems:   Review of Systems   Constitutional:  Negative for chills, fatigue, fever and unexpected weight change.   HENT:  Negative for congestion, sneezing, sore throat, trouble swallowing and voice change.    Respiratory:  Negative for cough, shortness of breath and wheezing.    Cardiovascular:  Negative for chest pain, palpitations and leg swelling.   Gastrointestinal:         As detailed above.   Genitourinary:  Negative for dysuria and hematuria.   Musculoskeletal:  Negative for arthralgias and myalgias.   Skin:  Negative for pallor and rash.   Neurological:  Negative for dizziness, seizures, syncope, weakness, numbness and headaches.   Hematological:  Negative for adenopathy. Does not bruise/bleed easily.   Psychiatric/Behavioral:  Negative for confusion. The patient is not nervous/anxious.    All other systems reviewed and are negative.      I performed a complete 10 point review of systems and it is  "negative except as noted in HPI or above.      PAST HISTORIES:       Past Medical History:  She has a past medical history of antineoplastic chemo, Hyperlipidemia, and Personal history of irradiation.    Past Surgical History:  She has a past surgical history that includes Mastectomy (Left) and Breast biopsy (Right).      Social History:  She reports that she has been smoking cigarettes. She has been smoking an average of .5 packs per day. She has never used smokeless tobacco. She reports current alcohol use of about 2.0 standard drinks of alcohol per week. She reports that she does not use drugs.    Family History:  No known GI disease, specifically denies pancreatitis, Crohn's, colon cancer, gastroesophageal cancer, or ulcerative colitis.    Family History   Problem Relation Name Age of Onset   • Breast cancer Paternal Grandmother          Allergies:  Patient has no known allergies.      Objective   OBJECTIVE:       Last Recorded Vitals:  Vitals:    02/26/24 1530   BP: 109/77   Pulse: 94   SpO2: 93%   Weight: 62.6 kg (138 lb)   Height: 1.626 m (5' 4\")     /77   Pulse 94   Ht 1.626 m (5' 4\")   Wt 62.6 kg (138 lb)   SpO2 93%   BMI 23.69 kg/m²      Physical Exam:    Physical Exam  Vitals reviewed.   Constitutional:       General: She is not in acute distress.     Appearance: She is not ill-appearing.   HENT:      Head: Normocephalic and atraumatic.   Eyes:      General: No scleral icterus.  Cardiovascular:      Rate and Rhythm: Normal rate and regular rhythm.      Pulses: Normal pulses.      Heart sounds: Normal heart sounds. No murmur heard.  Pulmonary:      Effort: Pulmonary effort is normal. No respiratory distress.      Breath sounds: Normal breath sounds. No wheezing.   Abdominal:      General: Bowel sounds are normal.      Palpations: Abdomen is soft.      Tenderness: There is no abdominal tenderness. There is no rebound.   Musculoskeletal:         General: No swelling or deformity.   Skin:     " "General: Skin is warm and dry.      Coloration: Skin is not jaundiced.      Findings: No rash.   Neurological:      General: No focal deficit present.      Mental Status: She is alert and oriented to person, place, and time.   Psychiatric:         Mood and Affect: Mood normal.         Behavior: Behavior normal.         Thought Content: Thought content normal.         Judgment: Judgment normal.         Home Medications:  Prior to Admission medications    Medication Sig Start Date End Date Taking? Authorizing Provider   DULoxetine (Cymbalta) 30 mg DR capsule Take 1 capsule (30 mg) by mouth once daily. 1/31/24 4/30/24  Iveth Johnson DO   ergocalciferol (Vitamin D-2) 1.25 MG (05297 UT) capsule Take 1 capsule (50,000 Units) by mouth 1 (one) time per week. 2/15/24 4/11/24  Iveth Johnson DO   fluticasone (Flonase) 50 mcg/actuation nasal spray Administer 1 spray into each nostril once daily. Shake gently. Before first use, prime pump. After use, clean tip and replace cap. 1/31/24 1/30/25  Iveth Johnson DO   gabapentin (Neurontin) 300 mg capsule Take 1 capsule (300 mg) by mouth 3 times a day. 1/31/24 4/30/24  Iveth Johnson DO   traZODone (Desyrel) 50 mg tablet Take 1 tablet (50 mg) by mouth once daily at bedtime. 1/31/24 4/30/24  Iveth Johnson DO         Relevant Results Recent labs reviewed in the EMR.    No results found for: \"WBC\", \"HGB\", \"MCV\", \"PLT\", \"IRON\", \"TIBC\", \"IRONSAT\", \"FERRITIN\", \"[id number]\", \"FOLATE\"    Lab Results   Component Value Date/Time     01/31/2024 1338    K 3.9 01/31/2024 1338     (H) 01/31/2024 1338    BUN 12 01/31/2024 1338    CREATININE 0.78 01/31/2024 1338       Lab Results   Component Value Date/Time    BILITOT 1.0 01/31/2024 1338    ALKPHOS 66 01/31/2024 1338    AST 13 01/31/2024 1338    ALT 13 01/31/2024 1338       Lab Results   Component Value Date/Time    CRP <0.10 01/22/2024 1351    CALPS 712 (H) 01/31/2024 1338       Radiology: Imaging reviewed in the EMR.  BI " mammo right screening tomosynthesis    Result Date: 2/21/2024  Interpreted By:  Fidel Baum, STUDY: BI MAMMO RIGHT SCREENING TOMOSYNTHESIS;  2/16/2024 2:13 pm   ACCESSION NUMBER(S): [id number]   ORDERING CLINICIAN: LYNNE JACKSON   INDICATION: Signs/Symptoms:Screening.   COMPARISON: 08/15/2023   FINDINGS: 2D and tomosynthesis images of the right breast were reviewed at 1 mm slice thickness.   Density:  The breast tissue is heterogeneously dense, which may obscure small masses.   No suspicious masses or calcifications are identified.   This study was interpreted with CAD. Markers: [place]- skin lesion; triangle- palpable abnormality       No mammographic evidence of malignancy.   BI-RADS CATEGORY:   BI-RADS Category:  1 Negative. Recommendation:  Routine Screening Mammogram in 1 Year. Recommended Date:  1 Year. Laterality:  Bilateral.   For any future breast imaging appointments, please call [phone] ([phone]).     MACRO: None   Signed by: Fidel Baum 2/21/2024 9:13 AM Dictation workstation:   [id number]    BI transfer of outside films    Result Date: 2/20/2024  Outside images for comparison or treatment purposes, not interpreted by  Radiologists.    BI transfer of outside films    Result Date: 2/20/2024  Outside images for comparison or treatment purposes, not interpreted by  Radiologists.    Colonoscopy Diagnostic    Result Date: 2/6/2024  Table formatting from the original result was not included. Impression Single small angioectasia in the cecum; induced coagulation and hemostasis achieved with argon plasma coagulation 2 medium, flat (grade 2) hemorrhoids; bleeding was observed Findings Single small angioectasia in the cecum; no bleeding was identified; induced coagulation and hemostasis achieved with 2 applications of with argon plasma coagulation Two left lateral and posterior internal medium, flat (grade 2) hemorrhoids observed during retroflexion; bleeding was observed. Random biopsies of Ascending  /   Descending Colon .  R/ O Microscopic colitis  Recommendation  Await pathology results  Follow up with PCP  Follow up Dr conklin : 2 weeks.  Indication Diarrhea, unspecified type Staff Staff Role Golden Vargas MD Proceduralist Medications See Anesthesia Record. Preprocedure A history and physical has been performed, and patient medication allergies have been reviewed. The patient's tolerance of previous anesthesia has been reviewed. The risks and benefits of the procedure and the sedation options and risks were discussed with the patient. All questions were answered and informed consent obtained. Details of the Procedure The patient underwent monitored anesthesia care, which was administered by an anesthesia professional. The patient's blood pressure, ECG, ETCO2, heart rate, level of consciousness, oxygen and respirations were monitored throughout the procedure. A digital rectal exam was performed. A perianal exam was performed. The scope was introduced through the anus and advanced to the terminal ileum. Retroflexion was performed in the rectum. The quality of bowel preparation was evaluated using the [place] Bowel Preparation Scale with scores of: right colon = 3, transverse colon = 3, left colon = 3. The total BBPS score was 9. Bowel prep was adequate. The patient experienced no blood loss. The procedure was not difficult. The patient tolerated the procedure well. There were no apparent adverse events. Events Procedure Events Event Event Time ENDO SCOPE IN TIME 2/6/2024  1:57 PM ENDO CECUM REACHED 2/6/2024  2:03 PM ENDO SCOPE OUT TIME 2/6/2024  2:15 PM Specimens ID Type Source Tests Collected by Time 1 :  Tissue ASCENDING COLON BIOPSY SURGICAL PATHOLOGY EXAM Golden Vargas MD 2/6/2024 1407 2 :  Tissue COLON - DESCENDING BIOPSY SURGICAL PATHOLOGY EXAM Golden Vargas MD 2/6/2024 1412 Procedure Location [address] [address] [phone]  Referring Provider Iveth Johnson DO [address] [address] Procedure Provider Golden Vargas MD

## 2024-02-26 NOTE — PATIENT INSTRUCTIONS
The biopsies from your recent colonoscopy showed evidence of a type of microscopic colitis called lymphocytic colitis. We will treat this with a medication called Budesonide.    One of your medications (Duloxetine) has been associated with microscopic colitis in the past. You should discuss this medication with your primary care provider.      Budesonide  You have been prescribed Budesonide (Entocort) for treatment of your microscopic colitis.  This is a form of a steroid that stays mostly in the intestine.  Only about 10-20% of this medication is absorbed.    You should take three (3) pills per day for 8 weeks.  Eventually we will taper you off of this medication.  When tapering this medication you will start by taking two (2) pills per day for 2 weeks and then take one (1) pill per day for another 2 weeks and then stop completely.    Possible side effects of this medication include elevated blood sugar, headache, acne, nausea, diarrhea, or joint pain.     If you run into problems while taking this medication or while tapering off this medication you should call my office.        Follow up in 8 weeks.

## 2024-03-01 DIAGNOSIS — H69.90 DISORDER OF EUSTACHIAN TUBE, UNSPECIFIED LATERALITY: ICD-10-CM

## 2024-03-04 DIAGNOSIS — G47.00 INSOMNIA, UNSPECIFIED TYPE: ICD-10-CM

## 2024-03-04 RX ORDER — FLUTICASONE PROPIONATE 50 MCG
1 SPRAY, SUSPENSION (ML) NASAL DAILY
Qty: 16 ML | OUTPATIENT
Start: 2024-03-04 | End: 2025-03-04

## 2024-03-05 ENCOUNTER — PROCEDURE VISIT (OUTPATIENT)
Dept: SURGERY | Facility: CLINIC | Age: 69
End: 2024-03-05
Payer: MEDICARE

## 2024-03-05 VITALS
WEIGHT: 136.8 LBS | BODY MASS INDEX: 23.35 KG/M2 | SYSTOLIC BLOOD PRESSURE: 121 MMHG | OXYGEN SATURATION: 95 % | HEART RATE: 66 BPM | HEIGHT: 64 IN | DIASTOLIC BLOOD PRESSURE: 86 MMHG

## 2024-03-05 DIAGNOSIS — Z13.71 BRCA NEGATIVE: ICD-10-CM

## 2024-03-05 DIAGNOSIS — C50.812 MALIGNANT NEOPLASM OF OVERLAPPING SITES OF LEFT BREAST IN FEMALE, ESTROGEN RECEPTOR NEGATIVE (MULTI): Primary | ICD-10-CM

## 2024-03-05 DIAGNOSIS — Z17.1 MALIGNANT NEOPLASM OF OVERLAPPING SITES OF LEFT BREAST IN FEMALE, ESTROGEN RECEPTOR NEGATIVE (MULTI): Primary | ICD-10-CM

## 2024-03-05 DIAGNOSIS — Z12.31 ENCOUNTER FOR SCREENING MAMMOGRAM FOR BREAST CANCER: ICD-10-CM

## 2024-03-05 PROCEDURE — 36590 REMOVAL TUNNELED CV CATH: CPT | Performed by: SURGERY

## 2024-03-05 ASSESSMENT — ENCOUNTER SYMPTOMS
COUGH: 0
FLANK PAIN: 0
HEMATURIA: 0
EYE PAIN: 0
VOMITING: 0
DYSURIA: 0
SPEECH DIFFICULTY: 0
WOUND: 0
AGITATION: 0
NAUSEA: 0
SHORTNESS OF BREATH: 0
DIARRHEA: 0
CONSTIPATION: 0
ABDOMINAL PAIN: 0
EYE REDNESS: 0
FREQUENCY: 0
MYALGIAS: 0
ARTHRALGIAS: 0
BRUISES/BLEEDS EASILY: 0
CHILLS: 0
POLYPHAGIA: 0
HEADACHES: 0
CONFUSION: 0
WEAKNESS: 0
FATIGUE: 0
FEVER: 0

## 2024-03-05 NOTE — PROGRESS NOTES
GENERAL SURGERY OFFICE NOTE    Patient: Navya Clements    Age: 68 y.o.   Gender: female    MRN: [id number]    PCP: Iveth Johnson DO        SUBJECTIVE     Chief Complaint  Follow-up (Patient is here for a medi-port removal. Patient states that the last time her port was used was 1 year and 4 months ago.)       HPI  Navya returns to the office for removal of her Port-A-Cath.  The benefits and risk of this procedure were reviewed and she signed consent.    ROS  Review of Systems   Constitutional:  Negative for chills, fatigue and fever.   HENT:  Negative for congestion, ear pain and hearing loss.    Eyes:  Negative for pain and redness.   Respiratory:  Negative for cough and shortness of breath.    Cardiovascular:  Negative for chest pain and leg swelling.   Gastrointestinal:  Negative for abdominal pain, constipation, diarrhea, nausea and vomiting.   Endocrine: Negative for polyphagia.   Genitourinary:  Negative for dysuria, flank pain, frequency and hematuria.   Musculoskeletal:  Negative for arthralgias and myalgias.   Skin:  Negative for rash and wound.   Allergic/Immunologic: Negative for immunocompromised state.   Neurological:  Negative for speech difficulty, weakness and headaches.   Hematological:  Does not bruise/bleed easily.   Psychiatric/Behavioral:  Negative for agitation and confusion.           HISTORY     Past Medical History:   Diagnosis Date    Hx antineoplastic chemo     Hyperlipidemia     Personal history of irradiation         Past Surgical History:   Procedure Laterality Date    BREAST BIOPSY Right     MASTECTOMY Left         No Known Allergies     Social History     Tobacco Use   Smoking Status Every Day    Packs/day: .5    Types: Cigarettes   Smokeless Tobacco Never        Social History     Substance and Sexual Activity   Alcohol Use Yes    Alcohol/week: 2.0 standard drinks of alcohol    Types: 2 Standard drinks or equivalent per week    Comment: occasionally        HOME  "MEDICATIONS  Current Outpatient Medications   Medication Instructions    budesonide EC (Entocort EC) 3 mg 24 hr capsule Take 3 capsules (9 mg) by mouth once daily in the morning for 56 days, THEN 2 capsules (6 mg) once daily in the morning for 14 days, THEN 1 capsule (3 mg) once daily in the morning for 14 days.    DULoxetine (CYMBALTA) 30 mg, oral, Daily    ergocalciferol (VITAMIN D-2) 50,000 Units, oral, Weekly    fluticasone (Flonase) 50 mcg/actuation nasal spray 1 spray, Each Nostril, Daily, Shake gently. Before first use, prime pump. After use, clean tip and replace cap.    gabapentin (NEURONTIN) 300 mg, oral, 3 times daily    traZODone (DESYREL) 50 mg, oral, Nightly          OBJECTIVE   Last Recorded Vitals.  Blood pressure 121/86, pulse 66, height 1.626 m (5' 4\"), weight 62.1 kg (136 lb 12.8 oz), SpO2 95 %.     PHYSICAL EXAM  Physical Exam   General: Well-developed, well-nourished and in no acute distress.  Head: Normocephalic. Atraumatic.  Neck/thyroid: Neck is supple.   Eyes: Pupils equal round and reactive to light. Conjunctiva normal.  ENMT: No masses or deformity of external nose. External ears without masses.  Respiratory/Chest:  Normal respiratory effort.  Port-A-Cath in the subclavian position with visible catheter under the skin up to the right IJ.  Breast: s/p left mastectomy.  No palpable masses or skin lesions.  Right breast is moderate sized.  No predominant masses.  No skin changes or nipple discharge.  Lymphatics: No palpable lymphadenopathy of the cervical, supraclavicular or axillary regions.  Cardiovascular: Regular rate and rhythm.   Abdomen: Soft, nontender, nondistended.   Musculoskeletal: Joints and limbs are grossly normal. Normal gait. Normal range of motion of major joints.  Neuro: Oriented to person, place and time. No obvious neurological deficit. Motor strength grossly normal.  Psych: Normal mood and affect.    RESULTS   Labs  No results found for this or any previous visit (from " the past 24 hour(s)).    Radiology Resutls  MAMMO RIGHT SCREENING TOMOSYNTHESIS;  2/16/2024 2:13 pm      ACCESSION NUMBER(S):  [id number]      ORDERING CLINICIAN:  LYNNE JACKSON      INDICATION:  Signs/Symptoms:Screening.      COMPARISON:  08/15/2023      FINDINGS:  2D and tomosynthesis images of the right breast were reviewed at 1 mm  slice thickness.      Density:  The breast tissue is heterogeneously dense, which may  obscure small masses.      No suspicious masses or calcifications are identified.      This study was interpreted with CAD. Markers: [place]- skin lesion;  triangle- palpable abnormality      IMPRESSION:  No mammographic evidence of malignancy.      BI-RADS CATEGORY:      BI-RADS Category:  1 Negative.  Recommendation:  Routine Screening Mammogram in 1 Year.  Recommended Date:  1 Year.    Procedure:  She was laying in the supine position.  The right subclavian and neck area then was prepped with ChloraPrep and draped in usual sterile fashion.  A pause was taken the correct patient procedure were identified.  The area was locally anesthetized with 6 cc of 1% lidocaine with epinephrine.  The previous Port-A-Cath surgical incision was opened up using a 15 blade.  This incision was dissected down to the level of the Port-A-Cath.  The Port-A-Cath pocket was opened.  She actually had several permanent Prolene sutures holding the Port-A-Cath in place.  Once these were dissected out, the Port-A-Cath was pulled with the patient holding Valsalva.  Pressure was applied to the IJ area for 2 minutes.  No evidence of bleeding.  The pocket was closed using multiple interrupted stitches of 3-0 Vicryl suture.  The wound was dressed with Steri-Strips, gauze and tape.  Patient tolerated the procedure well specimen was not sent to pathology.    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  With regards to her breast cancer, her records were obtained.  History as above.  Will continue to monitor her on a yearly basis with a right screening mammogram and physical exam for her left breast cancer.  These both will be scheduled for February 2025. By physical exam, she has no evidence of recurrent cancer on the left.         [facility] in [place], [place] [phone]       Dr. Gates (oncology) at [facility] [phone]  2.  She states that she has blood drawn every 6 to 8 weeks and sent to her oncologist in [place].  3.  Routine postoperative instructions provided for the Port-A-Cath removal site.  She will call the office if she has any problems.    Leonie Hemphill MD, FACS   [facility]  [address] [place] [address];   [address]  [place]  [address] [phone]

## 2024-03-05 NOTE — PATIENT INSTRUCTIONS
1.  The gauze/tape dressing on for 24 hours.  You may remove the gauze/tape dressing the day after your procedure and shower as normal.  Shower with the Steri-Strips in place.  Pat dry the Steri-Strips after your shower.  Let the Steri-Strips fall off on their own.  If they do not fall off in 2 weeks, you may remove them.  2.  You may apply ice to the surgical site for 20 minutes 3 times a day to help with pain and swelling.  You may also take Tylenol or ibuprofen to help with pain or discomfort.  3.  Do not do any strenuous activity with your right arm until your Port-A-Cath site heals.  This will be approximately 2 weeks.  4.  You will be due for a right-sided screening mammogram in February 2025.  Return to Dr. Hemphill's office after this mammogram.  5.  Every month, you need to do your right sided breast self exam, and check your left chest wall.  If you identify any new lumps or abnormalities, please call Dr. Hemphill's office immediately.  [phone]

## 2024-03-06 PROBLEM — Z12.31 ENCOUNTER FOR SCREENING MAMMOGRAM FOR BREAST CANCER: Status: ACTIVE | Noted: 2024-03-06

## 2024-03-06 PROBLEM — Z13.71 BRCA NEGATIVE: Status: ACTIVE | Noted: 2024-03-06

## 2024-03-08 RX ORDER — TRAZODONE HYDROCHLORIDE 50 MG/1
50 TABLET ORAL NIGHTLY
Qty: 90 TABLET | Refills: 0 | OUTPATIENT
Start: 2024-03-08 | End: 2024-06-06

## 2024-03-18 ENCOUNTER — TELEPHONE (OUTPATIENT)
Dept: GASTROENTEROLOGY | Facility: CLINIC | Age: 69
End: 2024-03-18
Payer: MEDICARE

## 2024-03-18 NOTE — TELEPHONE ENCOUNTER
Patient has had diarrhea 4-5 times a day for 1 week. She takes one Imodium every day with no relief    She would also like something besides the Buudesimide. She said it is too expensive and she cannot afford it.    [facility]

## 2024-03-18 NOTE — TELEPHONE ENCOUNTER
She says that she was not able to take Budesonide because of cost.    She is having 4-5 BMs per day. She is taking Imodium once per day.      Will continue to titrate Imodium as needed (up to 4 times per day) and will also have staff reach out to her pharmacy to see if prior authorization is needed for Budesonide.

## 2024-03-26 NOTE — TELEPHONE ENCOUNTER
Patient notified and stated that she has looked with [facility] and it is still $100 but she will pick it up at the beginning of the month and let you know how it works.

## 2024-04-24 DIAGNOSIS — G62.9 NEUROPATHY: ICD-10-CM

## 2024-04-24 DIAGNOSIS — G47.00 INSOMNIA, UNSPECIFIED TYPE: ICD-10-CM

## 2024-04-24 DIAGNOSIS — H69.90 DISORDER OF EUSTACHIAN TUBE, UNSPECIFIED LATERALITY: ICD-10-CM

## 2024-04-24 DIAGNOSIS — E55.9 VITAMIN D DEFICIENCY: ICD-10-CM

## 2024-04-26 DIAGNOSIS — K52.832 LYMPHOCYTIC COLITIS: ICD-10-CM

## 2024-04-26 RX ORDER — BUDESONIDE 3 MG/1
CAPSULE, COATED PELLETS ORAL
Qty: 270 CAPSULE | Refills: 1 | OUTPATIENT
Start: 2024-04-26 | End: 2024-07-19

## 2024-04-26 RX ORDER — FLUTICASONE PROPIONATE 50 MCG
1 SPRAY, SUSPENSION (ML) NASAL DAILY
Qty: 16 ML | OUTPATIENT
Start: 2024-04-26 | End: 2025-04-26

## 2024-04-26 RX ORDER — GABAPENTIN 300 MG/1
300 CAPSULE ORAL 3 TIMES DAILY
Qty: 270 CAPSULE | Refills: 0 | OUTPATIENT
Start: 2024-04-26

## 2024-04-26 RX ORDER — ERGOCALCIFEROL 1.25 MG/1
50000 CAPSULE ORAL
Qty: 4 CAPSULE | Refills: 1 | OUTPATIENT
Start: 2024-04-28

## 2024-04-26 RX ORDER — TRAZODONE HYDROCHLORIDE 50 MG/1
50 TABLET ORAL NIGHTLY
Qty: 90 TABLET | Refills: 0 | OUTPATIENT
Start: 2024-04-26 | End: 2024-07-25

## 2024-05-03 DIAGNOSIS — G47.00 INSOMNIA, UNSPECIFIED TYPE: ICD-10-CM

## 2024-05-03 DIAGNOSIS — F33.0 MILD EPISODE OF RECURRENT MAJOR DEPRESSIVE DISORDER (CMS-HCC): ICD-10-CM

## 2024-05-03 DIAGNOSIS — G62.9 NEUROPATHY: ICD-10-CM

## 2024-05-03 NOTE — TELEPHONE ENCOUNTER
Pt called rx line ayt [id number] requesting pended meds and    Vit D    Next OV 5/22  Pt compliant  Pt OUT before appt  Pt uses [facility] Thx

## 2024-05-06 RX ORDER — GABAPENTIN 300 MG/1
300 CAPSULE ORAL 3 TIMES DAILY
Qty: 90 CAPSULE | Refills: 0 | Status: SHIPPED | OUTPATIENT
Start: 2024-05-06 | End: 2024-06-04 | Stop reason: SDUPTHER

## 2024-05-06 RX ORDER — DULOXETIN HYDROCHLORIDE 30 MG/1
30 CAPSULE, DELAYED RELEASE ORAL DAILY
Qty: 30 CAPSULE | Refills: 0 | Status: SHIPPED | OUTPATIENT
Start: 2024-05-06 | End: 2024-06-05

## 2024-05-06 RX ORDER — TRAZODONE HYDROCHLORIDE 50 MG/1
50 TABLET ORAL NIGHTLY
Qty: 30 TABLET | Refills: 0 | Status: SHIPPED | OUTPATIENT
Start: 2024-05-06 | End: 2024-05-22 | Stop reason: SDUPTHER

## 2024-05-21 ENCOUNTER — APPOINTMENT (OUTPATIENT)
Dept: GASTROENTEROLOGY | Facility: CLINIC | Age: 69
End: 2024-05-21
Payer: MEDICARE

## 2024-05-22 ENCOUNTER — OFFICE VISIT (OUTPATIENT)
Dept: PRIMARY CARE | Facility: CLINIC | Age: 69
End: 2024-05-22
Payer: MEDICARE

## 2024-05-22 VITALS
SYSTOLIC BLOOD PRESSURE: 124 MMHG | BODY MASS INDEX: 24.89 KG/M2 | WEIGHT: 145 LBS | TEMPERATURE: 96 F | DIASTOLIC BLOOD PRESSURE: 78 MMHG | OXYGEN SATURATION: 95 % | HEART RATE: 67 BPM

## 2024-05-22 DIAGNOSIS — G62.9 NEUROPATHY: ICD-10-CM

## 2024-05-22 DIAGNOSIS — E55.9 VITAMIN D DEFICIENCY: ICD-10-CM

## 2024-05-22 DIAGNOSIS — E78.2 MIXED HYPERLIPIDEMIA: Primary | ICD-10-CM

## 2024-05-22 DIAGNOSIS — G47.00 INSOMNIA, UNSPECIFIED TYPE: ICD-10-CM

## 2024-05-22 PROCEDURE — 1159F MED LIST DOCD IN RCRD: CPT | Performed by: FAMILY MEDICINE

## 2024-05-22 PROCEDURE — 99214 OFFICE O/P EST MOD 30 MIN: CPT | Performed by: FAMILY MEDICINE

## 2024-05-22 PROCEDURE — 1160F RVW MEDS BY RX/DR IN RCRD: CPT | Performed by: FAMILY MEDICINE

## 2024-05-22 RX ORDER — TRAZODONE HYDROCHLORIDE 50 MG/1
50 TABLET ORAL NIGHTLY
Qty: 30 TABLET | Refills: 0 | Status: SHIPPED | OUTPATIENT
Start: 2024-05-22 | End: 2024-06-21

## 2024-05-22 ASSESSMENT — ENCOUNTER SYMPTOMS
DIZZINESS: 0
SHORTNESS OF BREATH: 0
HEADACHES: 0
POLYDIPSIA: 0
BLOOD IN STOOL: 0
DIARRHEA: 0
VOMITING: 0
MYALGIAS: 0
DYSPHORIC MOOD: 0
DYSURIA: 0
CONSTIPATION: 1
ARTHRALGIAS: 0
ABDOMINAL PAIN: 0
FATIGUE: 0
PALPITATIONS: 0
POLYPHAGIA: 0
NAUSEA: 0

## 2024-05-22 NOTE — PATIENT INSTRUCTIONS
Recommend a predominant low fat whole foods plant based diet.  Cut back on meat, dairy, processed carbs, salt and oils(especially palm and coconut). Increase fiber in your diet.  Decrease alcohol as much as possible if you drink. Recommend regular exercise most days of the week(goal up to 150min per week). Also recommend good sleep habits aiming for 7-8 hours per night.     Recommend you follow up with liver specialists if your current stomach doctor does not hand liver issues    Continue your current meds    Obtain your blood work(from feb and today)    Return in 3 months, sooner if needed

## 2024-05-22 NOTE — PROGRESS NOTES
Subjective   Patient ID: Pamela Clements is a 68 y.o. female who presents for Hyperlipidemia and multiple issues. Due for bw    Hyperlipidemia  Pertinent negatives include no chest pain, myalgias or shortness of breath.      Insomnia: stable as long as on med. Ran out recently    Lipids/vit D; due for recheck. Completed high dose vit D    Hep C: canceled apt. Saw GI referral and pt felt she already had GI apt so cancelled apt. No new stomach pains but has chronic bloating    Neuropathy: has been stable    Review of Systems   Constitutional:  Negative for fatigue.   Eyes:  Negative for visual disturbance.   Respiratory:  Negative for shortness of breath.    Cardiovascular:  Negative for chest pain and palpitations.   Gastrointestinal:  Positive for constipation. Negative for abdominal pain, blood in stool, diarrhea, nausea and vomiting.   Endocrine: Negative for cold intolerance, heat intolerance, polydipsia, polyphagia and polyuria.   Genitourinary:  Negative for dysuria.   Musculoskeletal:  Negative for arthralgias and myalgias.   Skin:  Negative for rash.   Neurological:  Negative for dizziness and headaches.   Psychiatric/Behavioral:  Negative for dysphoric mood.        Objective   /78   Pulse 67   Temp 35.6 °C (96 °F)   Wt 65.8 kg (145 lb)   SpO2 95%   BMI 24.89 kg/m²     Physical Exam  Vitals and nursing note reviewed.   Constitutional:       General: She is not in acute distress.     Appearance: Normal appearance. She is not toxic-appearing.   HENT:      Head: Normocephalic.   Eyes:      General: No scleral icterus.     Pupils: Pupils are equal, round, and reactive to light.   Neck:      Vascular: No carotid bruit.   Cardiovascular:      Rate and Rhythm: Normal rate and regular rhythm.      Heart sounds: No murmur heard.  Pulmonary:      Effort: Pulmonary effort is normal. No respiratory distress.      Breath sounds: Normal breath sounds.   Abdominal:      Palpations: Abdomen is soft.       Tenderness: There is no abdominal tenderness. There is no guarding.   Musculoskeletal:         General: No tenderness.      Right lower leg: No edema.      Left lower leg: No edema.   Skin:     General: Skin is warm.   Neurological:      General: No focal deficit present.      Mental Status: She is alert.      Cranial Nerves: No cranial nerve deficit.   Psychiatric:         Mood and Affect: Mood normal.         Assessment/Plan   Problem List Items Addressed This Visit             ICD-10-CM    Mixed hyperlipidemia - Primary E78.2    Neuropathy G62.9    Vitamin D deficiency E55.9    Relevant Orders    Vitamin D 25-Hydroxy,Total (for eval of Vitamin D levels)    Insomnia G47.00    Relevant Medications    traZODone (Desyrel) 50 mg tablet     Reviewed prior bw. Recommendations given

## 2024-05-30 DIAGNOSIS — K52.832 LYMPHOCYTIC COLITIS: ICD-10-CM

## 2024-05-30 RX ORDER — BUDESONIDE 3 MG/1
CAPSULE, COATED PELLETS ORAL
Qty: 90 CAPSULE | Refills: 2 | OUTPATIENT
Start: 2024-05-30 | End: 2024-08-21

## 2024-05-31 ENCOUNTER — OFFICE VISIT (OUTPATIENT)
Dept: GASTROENTEROLOGY | Facility: CLINIC | Age: 69
End: 2024-05-31
Payer: MEDICARE

## 2024-05-31 VITALS
HEART RATE: 75 BPM | WEIGHT: 148 LBS | SYSTOLIC BLOOD PRESSURE: 130 MMHG | BODY MASS INDEX: 25.4 KG/M2 | DIASTOLIC BLOOD PRESSURE: 86 MMHG

## 2024-05-31 DIAGNOSIS — K52.832 LYMPHOCYTIC COLITIS: ICD-10-CM

## 2024-05-31 PROCEDURE — 1159F MED LIST DOCD IN RCRD: CPT | Performed by: INTERNAL MEDICINE

## 2024-05-31 PROCEDURE — 99214 OFFICE O/P EST MOD 30 MIN: CPT | Performed by: INTERNAL MEDICINE

## 2024-05-31 PROCEDURE — 1160F RVW MEDS BY RX/DR IN RCRD: CPT | Performed by: INTERNAL MEDICINE

## 2024-05-31 NOTE — PATIENT INSTRUCTIONS
Now that constipation is being treated you are having constipation. For constipation you can start using an over the counter laxative called Senna. You can take this once a day. If you need to increase the dose you can take it twice a day.      The biopsies from your recent colonoscopy showed evidence of a type of microscopic colitis called lymphocytic colitis. We are treating this with Budesonide.    One of your medications (Duloxetine) has been associated with microscopic colitis in the past. You should discuss this medication with your primary care provider.      Budesonide  You have been prescribed Budesonide (Entocort) for treatment of your microscopic colitis.  This is a form of a steroid that stays mostly in the intestine.  Only about 10-20% of this medication is absorbed.    Continue tapering the Budesonide as instructed with two (2) pills per day for 1 week and then take one (1) pill per day for another 1 week and then stop completely.    Possible side effects of this medication include elevated blood sugar, headache, acne, nausea, diarrhea, or joint pain.     If you run into problems while taking this medication or while tapering off this medication you should call my office.      Follow up in 2-3 months.

## 2024-05-31 NOTE — PROGRESS NOTES
"    [facility]    ASSESSMENT and PLAN:       Navya Clements \"Pamela\" is a 68 y.o. female with a significant past medical history of HLD and breast cancer who presents for follow up of diarrhea/lymphocytic colitis. .       Lymphocytic colitis  Evidence of microscopic (lymphocytic) colitis on pathology from recent colonoscopy. Labs to evaluate other causes such as celiac and EPI unremarkable. She is on Duloxetine which has been linked to microscopic colitis in the past. She is on Budesonide for treatment and has responded well with resolution of her diarrhea. She is now having constipation which I suspect is more of her baseline bowel pattern. She is completing her Budesonide taper and will also add a stimulant laxative (Senna) for constipation. Consider pelvic floor dysfunction as playing a role in constipation.  - follow up with PCP to consider alternative to Duloxetine which has been linked to development of microscopic colitis in the past  - complete Budesonide taper  - Senna for constipation      Hepatitis B  Previously labs have shown Hep B Core Ab positive (Hep B surface Ag negative). This could represent previous infection, but would need additional labs. Hep C surface Ab was positive and Hep C RNA was negative indicating spontaneous clearance of a previous infection or false positive Ab result. Could order additional labs for work up, but at this time she says that she would like to wait until she sees hepatology. She asked for the name of a hepatologist that sees patients close to her and I gave her Dr. Maloney's name who sees patients here at [facility].       Follow up in 2-3 months.        Rajendra Hutton MD        Gastroenterology    [facility]    Clinical   [facility]        Subjective   HISTORY OF PRESENT ILLNESS:     Chief Complaint  Follow-up    History Of Present Illness:    Navya MORAN" "Tyree \"Pamela\" is a 68 y.o. female with a significant past medical history of HLD and breast cancer who presents for follow up of diarrhea/lymphocytic colitis.     she follows with (Iveth Johnson DO) as her PCP.       There is an O&P from November 2023 that was negative. In August 2023 she did have Hep C testing through [facility] which showed a positive Ab with negative viral load consistent with spontaneous clearance of a previous infection or false positive Ab result. Her PCP has checked a Hep B core Ab which was positive and Hep B surface Ag which was negative. The patient says that she wants to see a hepatologist.     I had initially seen her in January 2024 for diarrhea at which time I had ordered labs that showed a normal TTG, normal TSH, normal CRP, normal stool pathogen panel, elevated calprotectin, and normal fecal elastase.     Colonoscopy on 2/6/2024 showed a small cecal angioectasia as well as internal hemorrhoids. Random biopsies at that time showed evidence of lymphocytic colitis. I had prescribed Budesonide.       She says that she has been using Budesonide and just decreased to 6 mg daily earlier this week. She is currently having constipation with a BM every 2-3 days. This is associated with bloating and generalized discomfort. She says that she has been having some straining with BMs. She has not had to push on the perineum to have a BM. She says that stool is soft.      Review of systems:   Review of Systems    I performed a complete 10 point review of systems and it is negative except as noted in HPI or above.      PAST HISTORIES:       Past Medical History:  She has a past medical history of antineoplastic chemo, Hyperlipidemia, and Personal history of irradiation.    Past Surgical History:  She has a past surgical history that includes Mastectomy (Left) and Breast biopsy (Right).      Social History:  She reports that she has been smoking cigarettes. She has never used smokeless tobacco. She " "reports current alcohol use of about 2.0 standard drinks of alcohol per week. She reports that she does not use drugs.    Family History:  No known GI disease, specifically denies any family history of pancreatitis, Crohn's, colon cancer, gastroesophageal cancer, or ulcerative colitis.    Family History   Problem Relation Name Age of Onset   • Breast cancer Paternal Grandmother          Allergies:  Patient has no known allergies.      Objective   OBJECTIVE:       Last Recorded Vitals:  Vitals:    05/31/24 1014   BP: 130/86   BP Location: Left arm   Patient Position: Sitting   Pulse: 75   Weight: 67.1 kg (148 lb)     /86 (BP Location: Left arm, Patient Position: Sitting)   Pulse 75   Wt 67.1 kg (148 lb)   BMI 25.40 kg/m²      Physical Exam:    Physical Exam    Home Medications:  Prior to Admission medications    Medication Sig Start Date End Date Taking? Authorizing Provider   budesonide EC (Entocort EC) 3 mg 24 hr capsule Take 3 capsules (9 mg) by mouth once daily in the morning for 56 days, THEN 2 capsules (6 mg) once daily in the morning for 14 days, THEN 1 capsule (3 mg) once daily in the morning for 14 days. 2/26/24 5/22/24  Rajendra Hutton MD   DULoxetine (Cymbalta) 30 mg DR capsule Take 1 capsule (30 mg) by mouth once daily. 5/6/24 6/5/24  Iveth Johnson DO   fluticasone (Flonase) 50 mcg/actuation nasal spray Administer 1 spray into each nostril once daily. Shake gently. Before first use, prime pump. After use, clean tip and replace cap. 1/31/24 1/30/25  Iveth Johnson DO   gabapentin (Neurontin) 300 mg capsule Take 1 capsule (300 mg) by mouth 3 times a day. 5/6/24 6/5/24  Iveth Johnson DO   traZODone (Desyrel) 50 mg tablet Take 1 tablet (50 mg) by mouth once daily at bedtime. 5/22/24 6/21/24  Iveth Johnson DO         Relevant Results Recent labs reviewed in the EMR.    No results found for: \"WBC\", \"HGB\", \"MCV\", \"PLT\", \"IRON\", \"TIBC\", \"IRONSAT\", \"FERRITIN\", \"[id number]\", \"FOLATE\"    Lab " Results   Component Value Date/Time     01/31/2024 1338    K 3.9 01/31/2024 1338     (H) 01/31/2024 1338    BUN 12 01/31/2024 1338    CREATININE 0.78 01/31/2024 1338       Lab Results   Component Value Date/Time    BILITOT 1.0 01/31/2024 1338    ALKPHOS 66 01/31/2024 1338    AST 13 01/31/2024 1338    ALT 13 01/31/2024 1338       Lab Results   Component Value Date/Time    CRP <0.10 01/22/2024 1351    CALPS 712 (H) 01/31/2024 1338       Radiology: Imaging reviewed in the EMR.  No results found.

## 2024-05-31 NOTE — LETTER
"Juliet 3, 2024     Iveth Johnson DO  [address]  [address] [address]    Patient: Pamela Clements   YOB: 1955   Date of Visit: 5/31/2024       Dear Dr. Iveth Johnson DO:    Thank you for referring Pamela Clements to me for evaluation. Below are my notes for this consultation.  If you have questions, please do not hesitate to call me. I look forward to following your patient along with you.       Sincerely,     Rajendra Hutton MD      CC: No Recipients  ______________________________________________________________________________________        [facility]    ASSESSMENT and PLAN:       Navya Clements \"Pamela\" is a 68 y.o. female with a significant past medical history of HLD and breast cancer who presents for follow up of diarrhea/lymphocytic colitis. .       Lymphocytic colitis  Evidence of microscopic (lymphocytic) colitis on pathology from recent colonoscopy. Labs to evaluate other causes such as celiac and EPI unremarkable. She is on Duloxetine which has been linked to microscopic colitis in the past. She is on Budesonide for treatment and has responded well with resolution of her diarrhea. She is now having constipation which I suspect is more of her baseline bowel pattern. She is completing her Budesonide taper and will also add a stimulant laxative (Senna) for constipation. Consider pelvic floor dysfunction as playing a role in constipation.  - follow up with PCP to consider alternative to Duloxetine which has been linked to development of microscopic colitis in the past  - complete Budesonide taper  - Senna for constipation      Hepatitis B  Previously labs have shown Hep B Core Ab positive (Hep B surface Ag negative). This could represent previous infection, but would need additional labs. Hep C surface Ab was positive and Hep C RNA was negative indicating spontaneous clearance of a previous infection or false positive Ab result. Could order additional " "labs for work up, but at this time she says that she would like to wait until she sees hepatology. She asked for the name of a hepatologist that sees patients close to her and I gave her Dr. Maloney's name who sees patients here at [facility].       Follow up in 2-3 months.        Rajendra Hutton MD        Gastroenterology    [facility]    Clinical   [facility]        Subjective  HISTORY OF PRESENT ILLNESS:     Chief Complaint  Follow-up    History Of Present Illness:    Navya Clements \"Pamela\" is a 68 y.o. female with a significant past medical history of HLD and breast cancer who presents for follow up of diarrhea/lymphocytic colitis.     she follows with (Iveth Johnson DO) as her PCP.       There is an O&P from November 2023 that was negative. In August 2023 she did have Hep C testing through [facility] which showed a positive Ab with negative viral load consistent with spontaneous clearance of a previous infection or false positive Ab result. Her PCP has checked a Hep B core Ab which was positive and Hep B surface Ag which was negative. The patient says that she wants to see a hepatologist.     I had initially seen her in January 2024 for diarrhea at which time I had ordered labs that showed a normal TTG, normal TSH, normal CRP, normal stool pathogen panel, elevated calprotectin, and normal fecal elastase.     Colonoscopy on 2/6/2024 showed a small cecal angioectasia as well as internal hemorrhoids. Random biopsies at that time showed evidence of lymphocytic colitis. I had prescribed Budesonide.       She says that she has been using Budesonide and just decreased to 6 mg daily earlier this week. She is currently having constipation with a BM every 2-3 days. This is associated with bloating and generalized discomfort. She says that she has been having some straining with BMs. She has not had to push on the perineum to " have a BM. She says that stool is soft.      Review of systems:   Review of Systems    I performed a complete 10 point review of systems and it is negative except as noted in HPI or above.      PAST HISTORIES:       Past Medical History:  She has a past medical history of antineoplastic chemo, Hyperlipidemia, and Personal history of irradiation.    Past Surgical History:  She has a past surgical history that includes Mastectomy (Left) and Breast biopsy (Right).      Social History:  She reports that she has been smoking cigarettes. She has never used smokeless tobacco. She reports current alcohol use of about 2.0 standard drinks of alcohol per week. She reports that she does not use drugs.    Family History:  No known GI disease, specifically denies any family history of pancreatitis, Crohn's, colon cancer, gastroesophageal cancer, or ulcerative colitis.    Family History   Problem Relation Name Age of Onset   • Breast cancer Paternal Grandmother          Allergies:  Patient has no known allergies.      Objective  OBJECTIVE:       Last Recorded Vitals:  Vitals:    05/31/24 1014   BP: 130/86   BP Location: Left arm   Patient Position: Sitting   Pulse: 75   Weight: 67.1 kg (148 lb)     /86 (BP Location: Left arm, Patient Position: Sitting)   Pulse 75   Wt 67.1 kg (148 lb)   BMI 25.40 kg/m²      Physical Exam:    Physical Exam    Home Medications:  Prior to Admission medications    Medication Sig Start Date End Date Taking? Authorizing Provider   budesonide EC (Entocort EC) 3 mg 24 hr capsule Take 3 capsules (9 mg) by mouth once daily in the morning for 56 days, THEN 2 capsules (6 mg) once daily in the morning for 14 days, THEN 1 capsule (3 mg) once daily in the morning for 14 days. 2/26/24 5/22/24  Rajendra Hutton MD   DULoxetine (Cymbalta) 30 mg DR capsule Take 1 capsule (30 mg) by mouth once daily. 5/6/24 6/5/24  Iveth Johnson DO   fluticasone (Flonase) 50 mcg/actuation nasal spray Administer 1 spray  "into each nostril once daily. Shake gently. Before first use, prime pump. After use, clean tip and replace cap. 1/31/24 1/30/25  Iveth Johnson,    gabapentin (Neurontin) 300 mg capsule Take 1 capsule (300 mg) by mouth 3 times a day. 5/6/24 6/5/24  Iveth Johnson DO   traZODone (Desyrel) 50 mg tablet Take 1 tablet (50 mg) by mouth once daily at bedtime. 5/22/24 6/21/24  Iveth Johnson DO         Relevant Results Recent labs reviewed in the EMR.    No results found for: \"WBC\", \"HGB\", \"MCV\", \"PLT\", \"IRON\", \"TIBC\", \"IRONSAT\", \"FERRITIN\", \"CGVMALBW09\", \"FOLATE\"    Lab Results   Component Value Date/Time     01/31/2024 1338    K 3.9 01/31/2024 1338     (H) 01/31/2024 1338    BUN 12 01/31/2024 1338    CREATININE 0.78 01/31/2024 1338       Lab Results   Component Value Date/Time    BILITOT 1.0 01/31/2024 1338    ALKPHOS 66 01/31/2024 1338    AST 13 01/31/2024 1338    ALT 13 01/31/2024 1338       Lab Results   Component Value Date/Time    CRP <0.10 01/22/2024 1351    CALPS 712 (H) 01/31/2024 1338       Radiology: Imaging reviewed in the EMR.  No results found.          "

## 2024-06-04 ENCOUNTER — OFFICE VISIT (OUTPATIENT)
Dept: PRIMARY CARE | Facility: CLINIC | Age: 69
End: 2024-06-04
Payer: MEDICARE

## 2024-06-04 VITALS
TEMPERATURE: 97.6 F | BODY MASS INDEX: 25.75 KG/M2 | WEIGHT: 150 LBS | DIASTOLIC BLOOD PRESSURE: 82 MMHG | OXYGEN SATURATION: 95 % | HEART RATE: 72 BPM | SYSTOLIC BLOOD PRESSURE: 122 MMHG

## 2024-06-04 DIAGNOSIS — G62.9 NEUROPATHY: ICD-10-CM

## 2024-06-04 DIAGNOSIS — M19.90 ARTHRITIS: ICD-10-CM

## 2024-06-04 DIAGNOSIS — F41.8 MIXED ANXIETY DEPRESSIVE DISORDER: Primary | ICD-10-CM

## 2024-06-04 PROCEDURE — 1160F RVW MEDS BY RX/DR IN RCRD: CPT | Performed by: FAMILY MEDICINE

## 2024-06-04 PROCEDURE — 1159F MED LIST DOCD IN RCRD: CPT | Performed by: FAMILY MEDICINE

## 2024-06-04 PROCEDURE — 99214 OFFICE O/P EST MOD 30 MIN: CPT | Performed by: FAMILY MEDICINE

## 2024-06-04 RX ORDER — GABAPENTIN 300 MG/1
300 CAPSULE ORAL 3 TIMES DAILY
Qty: 90 CAPSULE | Refills: 0 | Status: SHIPPED | OUTPATIENT
Start: 2024-06-04 | End: 2024-07-04

## 2024-06-04 ASSESSMENT — ENCOUNTER SYMPTOMS
PALPITATIONS: 0
VOMITING: 0
SHORTNESS OF BREATH: 0
CONSTIPATION: 1
DIARRHEA: 0
NAUSEA: 0
ABDOMINAL PAIN: 0
HEADACHES: 0
FATIGUE: 0
DIZZINESS: 0
DYSURIA: 0
DIFFICULTY URINATING: 0
MYALGIAS: 0

## 2024-06-04 NOTE — PROGRESS NOTES
Subjective   Patient ID: Pamela Clements is a 68 y.o. female who presents for Forms/questionnaires (Discuss form for housing ) and multiple issues. Needs paperwork filled out for apartment. Pt lives in 1 floor apartment    HPI     Neuropathy: chronic. Dxd 3 yrs ago after cancer treatment. On gabapentin. Helps. In special housing in handicap building per pt. Had orders from doctor to not be in housing w/ stairs use to neuropathy. Lives in 2 bedroom apt. Has 2 service dogs(emotional support dogs) who use extra room and pt has supplies in the extra room for her neuropathy(tub to soak feet and elliptical). Has trouble walking up steps and walking long distances. Has some SOB w/ walking long distances due to smoking. Occ has pain in legs. Has chronic tingling in legs. Soaking helps pain(soaks feet 1-2x per wk). Tripped recently trying to step over 12 1/2 inch barrier and pt tripped bruise on back of leg. Was able to catch fall.     Mood: chronic. Dxd 8 yrs ago after dad committed suicide. Stable on meds but has 2 emotional support animals who help keep her calm. Breathing issues from smoking worsens anxiety.     Arthritis: mostly neck, rt hip and knee. Has trouble walking long distances and up steps.     Review of Systems   Constitutional:  Negative for fatigue.   Eyes:  Negative for visual disturbance.   Respiratory:  Negative for shortness of breath.    Cardiovascular:  Negative for chest pain and palpitations.   Gastrointestinal:  Positive for constipation (laxatives helping. seeing GI). Negative for abdominal pain, diarrhea, nausea and vomiting.   Genitourinary:  Negative for difficulty urinating and dysuria.   Musculoskeletal:  Negative for myalgias.   Skin:  Negative for rash.   Neurological:  Negative for dizziness and headaches.   Psychiatric/Behavioral:          As above       Objective   /82   Pulse 72   Temp 36.4 °C (97.6 °F)   Wt 68 kg (150 lb)   SpO2 95%   BMI 25.75 kg/m²     Physical  Exam  Vitals and nursing note reviewed.   Constitutional:       General: She is not in acute distress.     Appearance: Normal appearance. She is not toxic-appearing.   HENT:      Head: Normocephalic.   Eyes:      General: No scleral icterus.     Pupils: Pupils are equal, round, and reactive to light.   Cardiovascular:      Rate and Rhythm: Normal rate and regular rhythm.      Heart sounds: No murmur heard.  Pulmonary:      Effort: Pulmonary effort is normal. No respiratory distress.      Breath sounds: Normal breath sounds.   Musculoskeletal:      Right lower leg: No edema.      Left lower leg: No edema.      Comments: +large ecchymoses left posterior distal leg.  Strength intact bilateral legs.  Sensation slightly decreased bilateral lower extremities   Skin:     General: Skin is warm.   Neurological:      General: No focal deficit present.      Mental Status: She is alert.      Cranial Nerves: No cranial nerve deficit.      Gait: Gait normal.   Psychiatric:         Mood and Affect: Mood normal.         Assessment/Plan   Problem List Items Addressed This Visit             ICD-10-CM    Arthritis M19.90    Neuropathy G62.9    Relevant Medications    gabapentin (Neurontin) 300 mg capsule    Mixed anxiety depressive disorder - Primary F41.8     Paperwork filled out. Rec caution w/ walking

## 2024-06-04 NOTE — PATIENT INSTRUCTIONS
Continue your current meds    Continue as needed soaking for feet     Be careful when walking to prevent falls.     Will consider PT if falls recurrent.    Return as scheduled, sooner if needed

## 2024-06-10 DIAGNOSIS — G47.00 INSOMNIA, UNSPECIFIED TYPE: ICD-10-CM

## 2024-06-12 RX ORDER — TRAZODONE HYDROCHLORIDE 50 MG/1
50 TABLET ORAL NIGHTLY
Qty: 90 TABLET | Refills: 0 | Status: SHIPPED | OUTPATIENT
Start: 2024-06-12 | End: 2024-09-10

## 2024-07-09 DIAGNOSIS — G62.9 NEUROPATHY: ICD-10-CM

## 2024-07-09 RX ORDER — GABAPENTIN 300 MG/1
300 CAPSULE ORAL 3 TIMES DAILY
Qty: 90 CAPSULE | Refills: 0 | Status: SHIPPED | OUTPATIENT
Start: 2024-07-09 | End: 2024-08-08

## 2024-07-09 NOTE — TELEPHONE ENCOUNTER
Pt called rx line at 918a requesting a refill on pended med - OUT    Next OV 8/22  Pt compliant  Pt OUT  Pt uses [facility] Thx

## 2024-07-12 ENCOUNTER — LAB (OUTPATIENT)
Dept: LAB | Facility: LAB | Age: 69
End: 2024-07-12
Payer: MEDICARE

## 2024-07-12 DIAGNOSIS — E55.9 VITAMIN D DEFICIENCY: ICD-10-CM

## 2024-07-12 DIAGNOSIS — E78.2 MIXED HYPERLIPIDEMIA: ICD-10-CM

## 2024-07-12 LAB
25(OH)D3 SERPL-MCNC: 21 NG/ML (ref 30–100)
ALBUMIN SERPL BCP-MCNC: 4 G/DL (ref 3.4–5)
ALP SERPL-CCNC: 64 U/L (ref 33–136)
ALT SERPL W P-5'-P-CCNC: 18 U/L (ref 7–45)
ANION GAP SERPL CALC-SCNC: 12 MMOL/L (ref 10–20)
AST SERPL W P-5'-P-CCNC: 15 U/L (ref 9–39)
BILIRUB SERPL-MCNC: 0.8 MG/DL (ref 0–1.2)
BUN SERPL-MCNC: 11 MG/DL (ref 6–23)
CALCIUM SERPL-MCNC: 9 MG/DL (ref 8.6–10.3)
CHLORIDE SERPL-SCNC: 104 MMOL/L (ref 98–107)
CHOLEST SERPL-MCNC: 354 MG/DL (ref 0–199)
CHOLESTEROL/HDL RATIO: 4.8
CO2 SERPL-SCNC: 27 MMOL/L (ref 21–32)
CREAT SERPL-MCNC: 0.79 MG/DL (ref 0.5–1.05)
EGFRCR SERPLBLD CKD-EPI 2021: 81 ML/MIN/1.73M*2
GLUCOSE SERPL-MCNC: 117 MG/DL (ref 74–99)
HDLC SERPL-MCNC: 73.7 MG/DL
LDLC SERPL CALC-MCNC: 231 MG/DL
NON HDL CHOLESTEROL: 280 MG/DL (ref 0–149)
POTASSIUM SERPL-SCNC: 3.7 MMOL/L (ref 3.5–5.3)
PROT SERPL-MCNC: 6.1 G/DL (ref 6.4–8.2)
SODIUM SERPL-SCNC: 139 MMOL/L (ref 136–145)
TRIGL SERPL-MCNC: 249 MG/DL (ref 0–149)
VLDL: 50 MG/DL (ref 0–40)

## 2024-07-12 PROCEDURE — 82306 VITAMIN D 25 HYDROXY: CPT

## 2024-07-12 PROCEDURE — 80061 LIPID PANEL: CPT

## 2024-07-12 PROCEDURE — 80053 COMPREHEN METABOLIC PANEL: CPT

## 2024-07-12 PROCEDURE — 36415 COLL VENOUS BLD VENIPUNCTURE: CPT

## 2024-07-25 ENCOUNTER — APPOINTMENT (OUTPATIENT)
Dept: GASTROENTEROLOGY | Facility: CLINIC | Age: 69
End: 2024-07-25
Payer: MEDICARE

## 2024-07-25 VITALS
DIASTOLIC BLOOD PRESSURE: 80 MMHG | WEIGHT: 151 LBS | OXYGEN SATURATION: 95 % | HEART RATE: 72 BPM | BODY MASS INDEX: 25.92 KG/M2 | SYSTOLIC BLOOD PRESSURE: 142 MMHG

## 2024-07-25 DIAGNOSIS — R76.8 HEPATITIS C ANTIBODY TEST POSITIVE: Primary | ICD-10-CM

## 2024-07-25 PROCEDURE — 99204 OFFICE O/P NEW MOD 45 MIN: CPT | Performed by: INTERNAL MEDICINE

## 2024-07-25 NOTE — PROGRESS NOTES
HEPATOLOGY NEW PATIENT VISIT    July 25, 2024    Dr. Iveth Johnson      Patient Name:   JORDI CLEMENTS  Medical Record Number: [id number]  YOB: 1955    Dear Dr. Johnson,    I had the pleasure of seeing Jordi Clements for consultation in the [facility] ([facility]). History and physical examination was performed. Pertinent available laboratory, imaging, pathology results were reviewed.    History of Present Illness:   The patient is a 68 year old white female who is referred for evaluation of hepatitis C (even though her hepatitis C RNA is undetectable) and hepatitis B immunity.    The patient was diagnosed with hepatitis C previously but her hepatitis C RNA was undetectable.  She was seen at [facility] who told her that this was probably a previously cleared hepatitis C infection.  These were again repeated and showed the same results.  She also underwent testing for hepatitis B that showed that she was immune from a previous infection.  Even though she was seen by a gastroenterologist who confirmed all of this, she still requested to be seen by a hepatologist.  She was thus referred to me.    The patient has no known history of acute hepatitis or jaundice.      She has never had any manifestations of liver disease including no jaundice, ascites, hepatic encephalopathy, or variceal bleeding.  She has never had a liver biopsy.  She has never been treated for hepatitis C.    She presented today for evaluation.  She denied any specific liver-related complaints.  She does have multiple subjective complaints as reviewed below and review of systems.    Past Medical/Surgical History:   Breast cancer (Multi), treated with chemotherapy and radiation in the past.  Lupus (Multi)  Smoker  Arthritis  Mixed hyperlipidemia  Neuropathy  Osteoporosis  Vitamin D deficiency  Insomnia  Mixed anxiety depressive disorder  Hepatitis C antibody test positive  Lymphocytic colitis  BRCA  negative  Encounter for screening mammogram for breast cancer      Family History:   She has 2 brothers who she feels have alcoholic liver disease.  There is no known family history of colon cancer.    Social History:   She is a smoker.  She denied intravenous drug use.  She does have a tattoo.  She denied blood transfusions.  She does drink about 4-6 alcoholic beverages per week.  She is not in a sexual relationship currently.  She does not believe that her 2 daughters have been tested for hepatitis C.    Review of systems: As noted above.  She has alternating constipation and diarrhea and is very distressed by this.  She has gained 20 pounds over the last couple of years.  She does have peripheral neuropathy reportedly due to her chemotherapy.  She also has pain and numbness in her left upper arm from her breast cancer treatment.  She does get bloating.  She has anxiety about her medical conditions.  No fever, chills, weight loss, visual changes, auditory changes, shortness of breath, chest pain, abdominal pain, GI bleeding, dysuria, hematuria, depression, or rash.       Medical, Surgical, Family, and Social Histories  Past Medical History:   Diagnosis Date    Hx antineoplastic chemo     Hyperlipidemia     Personal history of irradiation        Past Surgical History:   Procedure Laterality Date    BREAST BIOPSY Right     MASTECTOMY Left        Family History   Problem Relation Name Age of Onset    Breast cancer Paternal Grandmother         Social History     Socioeconomic History    Marital status: Single     Spouse name: Not on file    Number of children: Not on file    Years of education: Not on file    Highest education level: Not on file   Occupational History    Not on file   Tobacco Use    Smoking status: Every Day     Current packs/day: 0.50     Types: Cigarettes    Smokeless tobacco: Never   Vaping Use    Vaping status: Never Used   Substance and Sexual Activity    Alcohol use: Yes     Alcohol/week: 2.0  standard drinks of alcohol     Types: 2 Standard drinks or equivalent per week     Comment: occasionally    Drug use: Never    Sexual activity: Not on file   Other Topics Concern    Not on file   Social History Narrative    Not on file     Social Determinants of Health     Financial Resource Strain: Low Risk  (10/11/2023)    Received from [employer]    Overall Financial Resource Strain (CARDIA)     Difficulty of Paying Living Expenses: Not very hard   Food Insecurity: Food Insecurity Present (10/11/2023)    Received from [employer]    Hunger Vital Sign     Worried About Running Out of Food in the Last Year: Never true     Ran Out of Food in the Last Year: Sometimes true   Transportation Needs: No Transportation Needs (10/11/2023)    Received from [employer]    PRAPARE - Transportation     Lack of Transportation (Medical): No     Lack of Transportation (Non-Medical): No   Physical Activity: Sufficiently Active (10/11/2023)    Received from [employer]    Exercise Vital Sign     Days of Exercise per Week: 7 days     Minutes of Exercise per Session: 60 min   Stress: No Stress Concern Present (10/11/2023)    Received from [employer]    [place] [place] [employer] - Occupational Stress Questionnaire     Feeling of Stress : Only a little   Social Connections: Socially Isolated (10/11/2023)    Received from [employer]    Social Connection and Isolation Panel [NHANES]     Frequency of Communication with Friends and Family: More than three times a week     Frequency of Social Gatherings with Friends and Family: Once a week     Attends [employer] Services: Never     Active Member of Clubs or Organizations: No     Attends Club or Organization Meetings: Never     Marital Status:    Intimate Partner Violence: Not At Risk (10/11/2023)    Received from [employer]    Humiliation, Afraid, Rape, and Kick questionnaire     Fear of Current or Ex-Partner: No     Emotionally Abused: No     Physically Abused: No      Sexually Abused: No   Housing Stability: High Risk (10/11/2023)    Received from [facility]    Housing Stability Vital Sign     Unable to Pay for Housing in the Last Year: No     Number of Places Lived in the Last Year: 3     Unstable Housing in the Last Year: No       Allergies and Current Medications  No Known Allergies  Current Outpatient Medications   Medication Sig Dispense Refill    budesonide EC (Entocort EC) 3 mg 24 hr capsule Take 3 capsules (9 mg) by mouth once daily in the morning for 56 days, THEN 2 capsules (6 mg) once daily in the morning for 14 days, THEN 1 capsule (3 mg) once daily in the morning for 14 days. 210 capsule 0    DULoxetine (Cymbalta) 30 mg DR capsule Take 1 capsule (30 mg) by mouth once daily. 30 capsule 0    fluticasone (Flonase) 50 mcg/actuation nasal spray Administer 1 spray into each nostril once daily. Shake gently. Before first use, prime pump. After use, clean tip and replace cap. 16 g 0    gabapentin (Neurontin) 300 mg capsule Take 1 capsule (300 mg) by mouth 3 times a day. 90 capsule 0    traZODone (Desyrel) 50 mg tablet Take 1 tablet (50 mg) by mouth once daily at bedtime. 90 tablet 0     No current facility-administered medications for this visit.        Physical Exam  /80   Pulse 72   Wt 68.5 kg (151 lb)   SpO2 95%   BMI 25.92 kg/m²         Physical Examination:   General Appearance: alert and in no acute distress.  She is quite anxious.  HEENT: oropharynx without lesions. Anicteric sclerae.   Neck supple, nontender, without adenopathy, thyromegaly, or JVD.   Lungs clear to auscultation and percussion.   Heart RRR without murmurs, rubs, or gallops.   Abdomen: Soft, nontender, bowel sounds positive, without obvious ascites. Liver and spleen not palpable.   Extremities full ROM, no atrophy, normal strength. No edema.  Skin no specific lesions.  Neurological exam nonfocal, alert and oriented.  No asterixis.  No spider angiomata, or palmar erythema.     Labs  7/12/2024 cholesterol 354, , triglycerides 249, glucose 117, sodium 139, creatinine 0.79, protein 6.1, albumin 4, alk phos 64, bilirubin 0.8, AST 15, ALT 18.    Labs 2/16/2024 hepatitis C RNA undetectable, AFP 6, hepatitis B core antibody positive, hepatitis B surface antigen negative, hepatitis C antibody positive.    Labs 1/31/2024 cholesterol 284, , triglycerides 165, glucose 87, sodium 143, creatinine 0.78, protein 6.4, albumin 4.3, alk phos 66, bilirubin 1, AST 13, ALT 13, hepatitis B surface antibody positive, hepatitis A antibody negative.    Labs 8/7/2023 hepatitis C antibody positive, hepatitis C RNA negative.          Assessment/Plan:     Hepatitis C antibody positive, hepatitis C RNA negative, naive to therapy  Immunity to hepatitis B    The patient is referred to me for evaluation of hepatitis C.    I discussed with this patient about hepatitis C. We talked about household spread. We talked about sexual spread. We talked about ways to avoid spreading the disease. I explained that sexual partners should be tested, and if negative, they should make a decision about the use of condoms.  We also talked about avoiding sharing razor blades or toothbrushes with anyone. I also advised that alcohol use should be avoided.  I did recommend that she consider speaking to her children about getting tested for hepatitis B and hepatitis C.    In fact, her hepatitis C antibody was twice positive and her hepatitis C RNA was twice negative.  Keep in mind that 15 to 30% of patients resolve the hepatitis C infection on their own.  This is almost assuredly the case here.  I would recommend that her PCP not test her for hepatitis C antibodies in the future.  These will always be positive in her case.    She is immune to hepatitis B from previous infection.  Nothing needs to be done about this.    She is not immune to hepatitis A.  She can check with her insurance company if she is interested in getting these  vaccinations.    Otherwise, she does not need to see us back in the liver clinic.    She can follow-up with Dr. Hutton for her GI needs.  She is very concerned about her bloating, diarrhea and constipation.    Thank you for allowing me to participate in the care of this patient. Please feel free to contact me with any questions regarding their care.     Sincerely,     Roger Maloney MD, FAASLD, FACG.   Medical Director, Hepatology  Senior Attending Physician  [facility]  [facility]  Professor of Medicine  Division of Gastroenterology and Liver Disease  [employer] [employer]  [address] [place], [place] [id number]  Phone: [phone]  Fax: [phone].    Cc: Dr. Camille Hutton      This document was generated with a computerized dictation system.  Because of this, there could be errors in grammar and/or content.

## 2024-07-25 NOTE — PATIENT INSTRUCTIONS
You do not have active hepatitis C.    You are immune against hepatitis B.    Consider recommending to your children that they get tested for hepatitis B and hepatitis C.    We would suggest that you check with your insurance company about arranging hepatitis A vaccines.    Follow-up in the gastroenterology clinic.

## 2024-08-05 DIAGNOSIS — G62.9 NEUROPATHY: ICD-10-CM

## 2024-08-05 RX ORDER — GABAPENTIN 300 MG/1
300 CAPSULE ORAL 3 TIMES DAILY
Qty: 90 CAPSULE | Refills: 0 | Status: SHIPPED | OUTPATIENT
Start: 2024-08-05 | End: 2024-09-04

## 2024-08-05 NOTE — TELEPHONE ENCOUNTER
Pt called rx line at 1152 requesting refill on pended med.  Pt has OV on 8/22 but states she is going out of town on Wed and will not have enough until she is back. requesting a short supply until OV? Thanks, CG

## 2024-08-22 ENCOUNTER — APPOINTMENT (OUTPATIENT)
Dept: PRIMARY CARE | Facility: CLINIC | Age: 69
End: 2024-08-22
Payer: MEDICARE

## 2024-08-22 VITALS
WEIGHT: 152 LBS | SYSTOLIC BLOOD PRESSURE: 112 MMHG | HEART RATE: 72 BPM | BODY MASS INDEX: 26.09 KG/M2 | DIASTOLIC BLOOD PRESSURE: 72 MMHG | TEMPERATURE: 97.1 F

## 2024-08-22 DIAGNOSIS — F33.0 MILD EPISODE OF RECURRENT MAJOR DEPRESSIVE DISORDER (CMS-HCC): ICD-10-CM

## 2024-08-22 DIAGNOSIS — Z00.00 ROUTINE ADULT HEALTH MAINTENANCE: ICD-10-CM

## 2024-08-22 DIAGNOSIS — G62.9 NEUROPATHY: ICD-10-CM

## 2024-08-22 DIAGNOSIS — R73.01 IMPAIRED FASTING GLUCOSE: ICD-10-CM

## 2024-08-22 DIAGNOSIS — E78.2 MIXED HYPERLIPIDEMIA: Primary | ICD-10-CM

## 2024-08-22 DIAGNOSIS — E55.9 VITAMIN D DEFICIENCY: ICD-10-CM

## 2024-08-22 DIAGNOSIS — G47.00 INSOMNIA, UNSPECIFIED TYPE: ICD-10-CM

## 2024-08-22 PROCEDURE — 99214 OFFICE O/P EST MOD 30 MIN: CPT | Performed by: FAMILY MEDICINE

## 2024-08-22 PROCEDURE — 1160F RVW MEDS BY RX/DR IN RCRD: CPT | Performed by: FAMILY MEDICINE

## 2024-08-22 PROCEDURE — 1123F ACP DISCUSS/DSCN MKR DOCD: CPT | Performed by: FAMILY MEDICINE

## 2024-08-22 PROCEDURE — 1158F ADVNC CARE PLAN TLK DOCD: CPT | Performed by: FAMILY MEDICINE

## 2024-08-22 PROCEDURE — 1159F MED LIST DOCD IN RCRD: CPT | Performed by: FAMILY MEDICINE

## 2024-08-22 RX ORDER — ATORVASTATIN CALCIUM 40 MG/1
40 TABLET, FILM COATED ORAL DAILY
Qty: 90 TABLET | Refills: 0 | Status: SHIPPED | OUTPATIENT
Start: 2024-08-22 | End: 2024-11-20

## 2024-08-22 RX ORDER — DULOXETIN HYDROCHLORIDE 30 MG/1
30 CAPSULE, DELAYED RELEASE ORAL DAILY
Qty: 90 CAPSULE | Refills: 0 | Status: SHIPPED | OUTPATIENT
Start: 2024-08-22 | End: 2024-11-20

## 2024-08-22 RX ORDER — TRAZODONE HYDROCHLORIDE 50 MG/1
50 TABLET ORAL NIGHTLY
Qty: 90 TABLET | Refills: 0 | Status: SHIPPED | OUTPATIENT
Start: 2024-08-22 | End: 2024-11-20

## 2024-08-22 ASSESSMENT — ENCOUNTER SYMPTOMS
ABDOMINAL PAIN: 0
DYSURIA: 0
DIZZINESS: 0
SHORTNESS OF BREATH: 0
MYALGIAS: 0
DYSPHORIC MOOD: 0
POLYPHAGIA: 0
DIARRHEA: 0
VOMITING: 0
PALPITATIONS: 0
FATIGUE: 0
SLEEP DISTURBANCE: 0
HEADACHES: 0
CONSTIPATION: 0
POLYDIPSIA: 0
NAUSEA: 0
BLOOD IN STOOL: 0
DIFFICULTY URINATING: 0

## 2024-08-22 NOTE — PROGRESS NOTES
Subjective   Patient ID: Pamela Clements is a 69 y.o. female who presents for Hyperlipidemia and multiple issues.     HPI     Lipids: high. HDL 73, (188), (165). Was on chol med in past but stopped med. Willing to restart  Vit D: low. Taking unknown dose of vit D supplement  Mood/insomnia: controlled. Sleeping ok. Dog waking pt up  IFG: new onset.     Review of Systems   Constitutional:  Negative for fatigue.   Eyes:  Negative for visual disturbance.   Respiratory:  Negative for shortness of breath.    Cardiovascular:  Negative for chest pain and palpitations.   Gastrointestinal:  Negative for abdominal pain, blood in stool, constipation, diarrhea, nausea and vomiting.   Endocrine: Negative for cold intolerance, heat intolerance, polydipsia, polyphagia and polyuria.   Genitourinary:  Negative for difficulty urinating and dysuria.   Musculoskeletal:  Negative for myalgias.   Skin:  Negative for rash.   Neurological:  Negative for dizziness and headaches.   Psychiatric/Behavioral:  Negative for dysphoric mood and sleep disturbance.        Objective   /72   Pulse 72   Temp 36.2 °C (97.1 °F)   Wt 68.9 kg (152 lb)   BMI 26.09 kg/m²     Physical Exam  Vitals and nursing note reviewed.   Constitutional:       General: She is not in acute distress.     Appearance: Normal appearance. She is not toxic-appearing.   HENT:      Head: Normocephalic.   Eyes:      General: No scleral icterus.     Pupils: Pupils are equal, round, and reactive to light.   Neck:      Vascular: No carotid bruit.   Cardiovascular:      Rate and Rhythm: Normal rate and regular rhythm.      Heart sounds: No murmur heard.  Pulmonary:      Effort: Pulmonary effort is normal. No respiratory distress.      Breath sounds: Normal breath sounds.   Abdominal:      Palpations: Abdomen is soft.      Tenderness: There is no abdominal tenderness. There is no guarding.   Musculoskeletal:      Cervical back: Neck supple.      Right lower  leg: No edema.      Left lower leg: No edema.   Lymphadenopathy:      Cervical: No cervical adenopathy.   Skin:     General: Skin is warm.   Neurological:      General: No focal deficit present.      Mental Status: She is alert.      Cranial Nerves: No cranial nerve deficit.   Psychiatric:         Mood and Affect: Mood normal.         Assessment/Plan   Problem List Items Addressed This Visit             ICD-10-CM    Mixed hyperlipidemia - Primary E78.2    Relevant Medications    atorvastatin (Lipitor) 40 mg tablet    Other Relevant Orders    Comprehensive Metabolic Panel    Lipid Panel    Neuropathy G62.9    Relevant Medications    DULoxetine (Cymbalta) 30 mg DR capsule    Vitamin D deficiency E55.9    Relevant Orders    Vitamin D 25-Hydroxy,Total (for eval of Vitamin D levels)    Insomnia G47.00    Relevant Medications    traZODone (Desyrel) 50 mg tablet     Other Visit Diagnoses         Codes    Mild episode of recurrent major depressive disorder (CMS-HCC)     F33.0    Relevant Medications    DULoxetine (Cymbalta) 30 mg DR capsule    Impaired fasting glucose     R73.01    Relevant Orders    Hemoglobin A1C    Routine adult health maintenance     Z00.00    Relevant Orders    DNR (Completed)        Discussed bw. Recommendations given. Discussed side effect of new med     ACP note: pt will get advance directives in place and bring in. She has chosen DNR status

## 2024-08-22 NOTE — PATIENT INSTRUCTIONS
Recommend a predominant low fat whole foods plant based diet.  Cut back on meat, dairy, processed carbs, salt and oils(especially palm and coconut). Increase fiber in your diet.  Decrease alcohol as much as possible if you drink. Recommend regular exercise most days of the week(goal up to 150min per week). Also recommend good sleep habits aiming for 7-8 hours per night.     Continue your current meds and restart atorvastatin. Take with coq10 100-200mg    Call with vit D dose your are taking    Recommend you get advance directives/living completed and bring copies to next appointment    Return in 3 months, sooner if needed

## 2024-09-17 DIAGNOSIS — G62.9 NEUROPATHY: ICD-10-CM

## 2024-09-17 RX ORDER — GABAPENTIN 300 MG/1
300 CAPSULE ORAL 3 TIMES DAILY
Qty: 90 CAPSULE | Refills: 0 | Status: SHIPPED | OUTPATIENT
Start: 2024-09-17 | End: 2024-10-17

## 2024-10-14 ENCOUNTER — TELEPHONE (OUTPATIENT)
Dept: PRIMARY CARE | Facility: CLINIC | Age: 69
End: 2024-10-14
Payer: MEDICARE

## 2024-10-14 ENCOUNTER — TELEPHONE (OUTPATIENT)
Dept: GASTROENTEROLOGY | Facility: CLINIC | Age: 69
End: 2024-10-14
Payer: MEDICARE

## 2024-10-14 NOTE — TELEPHONE ENCOUNTER
Spoke to patient.    She says that she has been having loose stool about 6-7 times per day. She says that diarrhea returned a few weeks after completing Budesonide taper. She is still taking Duloxetine. She is currently taking Imodium (two pills per day).    Discussed options for managing symptoms including an additional course of Budesonide or titrating Imodium as needed to help symptoms. At this time she would prefer to continue using Imodium. She will start with one pill before breakfast and then add additional doses (before lunch, before dinner, at bedtime) as needed. Discussed need to gradually increase the dose and also discussed the need to avoid constipation.    She will also discuss an alternative to Duloxetine with her PCP as that has been linked to development of microscopic colitis .

## 2024-10-30 ENCOUNTER — APPOINTMENT (OUTPATIENT)
Dept: PRIMARY CARE | Facility: CLINIC | Age: 69
End: 2024-10-30
Payer: MEDICARE

## 2024-10-31 DIAGNOSIS — G62.9 NEUROPATHY: ICD-10-CM

## 2024-10-31 NOTE — TELEPHONE ENCOUNTER
Pt called Rx line asking for a refill on pended medication, pt is out. Please advise, AM    [facility]

## 2024-11-01 RX ORDER — GABAPENTIN 300 MG/1
300 CAPSULE ORAL 3 TIMES DAILY
Qty: 90 CAPSULE | Refills: 0 | Status: SHIPPED | OUTPATIENT
Start: 2024-11-01 | End: 2024-12-01

## 2024-11-19 ENCOUNTER — LAB (OUTPATIENT)
Dept: LAB | Facility: LAB | Age: 69
End: 2024-11-19
Payer: MEDICARE

## 2024-11-19 DIAGNOSIS — E78.2 MIXED HYPERLIPIDEMIA: ICD-10-CM

## 2024-11-19 DIAGNOSIS — E55.9 VITAMIN D DEFICIENCY: ICD-10-CM

## 2024-11-19 DIAGNOSIS — R73.01 IMPAIRED FASTING GLUCOSE: ICD-10-CM

## 2024-11-19 LAB
25(OH)D3 SERPL-MCNC: 16 NG/ML (ref 30–100)
ALBUMIN SERPL BCP-MCNC: 4.3 G/DL (ref 3.4–5)
ALP SERPL-CCNC: 82 U/L (ref 33–136)
ALT SERPL W P-5'-P-CCNC: 26 U/L (ref 7–45)
ANION GAP SERPL CALC-SCNC: 12 MMOL/L (ref 10–20)
AST SERPL W P-5'-P-CCNC: 15 U/L (ref 9–39)
BILIRUB SERPL-MCNC: 1.1 MG/DL (ref 0–1.2)
BUN SERPL-MCNC: 16 MG/DL (ref 6–23)
CALCIUM SERPL-MCNC: 9.6 MG/DL (ref 8.6–10.3)
CHLORIDE SERPL-SCNC: 104 MMOL/L (ref 98–107)
CHOLEST SERPL-MCNC: 370 MG/DL (ref 0–199)
CHOLESTEROL/HDL RATIO: 5.4
CO2 SERPL-SCNC: 29 MMOL/L (ref 21–32)
CREAT SERPL-MCNC: 0.78 MG/DL (ref 0.5–1.05)
EGFRCR SERPLBLD CKD-EPI 2021: 82 ML/MIN/1.73M*2
EST. AVERAGE GLUCOSE BLD GHB EST-MCNC: 143 MG/DL
GLUCOSE SERPL-MCNC: 127 MG/DL (ref 74–99)
HBA1C MFR BLD: 6.6 %
HDLC SERPL-MCNC: 68.2 MG/DL
LDLC SERPL CALC-MCNC: 248 MG/DL
NON HDL CHOLESTEROL: 302 MG/DL (ref 0–149)
POTASSIUM SERPL-SCNC: 4.1 MMOL/L (ref 3.5–5.3)
PROT SERPL-MCNC: 6.3 G/DL (ref 6.4–8.2)
SODIUM SERPL-SCNC: 141 MMOL/L (ref 136–145)
TRIGL SERPL-MCNC: 271 MG/DL (ref 0–149)
VLDL: 54 MG/DL (ref 0–40)

## 2024-11-19 PROCEDURE — 80061 LIPID PANEL: CPT

## 2024-11-19 PROCEDURE — 83036 HEMOGLOBIN GLYCOSYLATED A1C: CPT

## 2024-11-19 PROCEDURE — 36415 COLL VENOUS BLD VENIPUNCTURE: CPT

## 2024-11-19 PROCEDURE — 80053 COMPREHEN METABOLIC PANEL: CPT

## 2024-11-19 PROCEDURE — 82306 VITAMIN D 25 HYDROXY: CPT

## 2024-11-22 DIAGNOSIS — G47.00 INSOMNIA, UNSPECIFIED TYPE: ICD-10-CM

## 2024-11-22 NOTE — TELEPHONE ENCOUNTER
Pt called rx line @ 12:51pm requesting RF on Trazodone. [facility]    Next OV 11/25/24  Ok for RF?  Please advise. Thanks. JW

## 2024-11-25 ENCOUNTER — APPOINTMENT (OUTPATIENT)
Dept: PRIMARY CARE | Facility: CLINIC | Age: 69
End: 2024-11-25
Payer: MEDICARE

## 2024-11-25 VITALS
WEIGHT: 150 LBS | OXYGEN SATURATION: 98 % | TEMPERATURE: 97.1 F | DIASTOLIC BLOOD PRESSURE: 70 MMHG | SYSTOLIC BLOOD PRESSURE: 122 MMHG | HEART RATE: 74 BPM | BODY MASS INDEX: 25.75 KG/M2

## 2024-11-25 DIAGNOSIS — F33.0 MILD EPISODE OF RECURRENT MAJOR DEPRESSIVE DISORDER (CMS-HCC): Primary | ICD-10-CM

## 2024-11-25 DIAGNOSIS — E11.69 TYPE 2 DIABETES MELLITUS WITH OTHER SPECIFIED COMPLICATION, WITHOUT LONG-TERM CURRENT USE OF INSULIN: ICD-10-CM

## 2024-11-25 DIAGNOSIS — G47.00 INSOMNIA, UNSPECIFIED TYPE: ICD-10-CM

## 2024-11-25 DIAGNOSIS — E55.9 VITAMIN D DEFICIENCY: ICD-10-CM

## 2024-11-25 DIAGNOSIS — G62.9 NEUROPATHY: ICD-10-CM

## 2024-11-25 DIAGNOSIS — F41.9 ANXIETY: ICD-10-CM

## 2024-11-25 DIAGNOSIS — E78.2 MIXED HYPERLIPIDEMIA: ICD-10-CM

## 2024-11-25 PROCEDURE — 3074F SYST BP LT 130 MM HG: CPT | Performed by: FAMILY MEDICINE

## 2024-11-25 PROCEDURE — 99214 OFFICE O/P EST MOD 30 MIN: CPT | Performed by: FAMILY MEDICINE

## 2024-11-25 PROCEDURE — 1160F RVW MEDS BY RX/DR IN RCRD: CPT | Performed by: FAMILY MEDICINE

## 2024-11-25 PROCEDURE — 3044F HG A1C LEVEL LT 7.0%: CPT | Performed by: FAMILY MEDICINE

## 2024-11-25 PROCEDURE — 3050F LDL-C >= 130 MG/DL: CPT | Performed by: FAMILY MEDICINE

## 2024-11-25 PROCEDURE — 3078F DIAST BP <80 MM HG: CPT | Performed by: FAMILY MEDICINE

## 2024-11-25 PROCEDURE — 1159F MED LIST DOCD IN RCRD: CPT | Performed by: FAMILY MEDICINE

## 2024-11-25 PROCEDURE — 1123F ACP DISCUSS/DSCN MKR DOCD: CPT | Performed by: FAMILY MEDICINE

## 2024-11-25 RX ORDER — ERGOCALCIFEROL 1.25 MG/1
50000 CAPSULE ORAL WEEKLY
Qty: 4 CAPSULE | Refills: 1 | Status: SHIPPED | OUTPATIENT
Start: 2024-11-25 | End: 2025-01-20

## 2024-11-25 RX ORDER — CITALOPRAM 20 MG/1
20 TABLET, FILM COATED ORAL DAILY
Qty: 30 TABLET | Refills: 1 | Status: SHIPPED | OUTPATIENT
Start: 2024-11-25 | End: 2025-01-24

## 2024-11-25 RX ORDER — TRAZODONE HYDROCHLORIDE 50 MG/1
50 TABLET ORAL NIGHTLY
Qty: 90 TABLET | Refills: 0 | OUTPATIENT
Start: 2024-11-25 | End: 2025-02-23

## 2024-11-25 RX ORDER — ROSUVASTATIN CALCIUM 40 MG/1
40 TABLET, COATED ORAL DAILY
Qty: 100 TABLET | Refills: 3 | Status: SHIPPED | OUTPATIENT
Start: 2024-11-25 | End: 2025-12-30

## 2024-11-25 RX ORDER — GABAPENTIN 300 MG/1
300 CAPSULE ORAL 3 TIMES DAILY
Qty: 90 CAPSULE | Refills: 0 | Status: SHIPPED | OUTPATIENT
Start: 2024-11-25 | End: 2024-12-25

## 2024-11-25 RX ORDER — GABAPENTIN 300 MG/1
300 CAPSULE ORAL 3 TIMES DAILY
Qty: 90 CAPSULE | Refills: 0 | Status: CANCELLED | OUTPATIENT
Start: 2024-11-25 | End: 2024-12-25

## 2024-11-25 RX ORDER — ATORVASTATIN CALCIUM 40 MG/1
40 TABLET, FILM COATED ORAL DAILY
Qty: 90 TABLET | Refills: 0 | Status: CANCELLED | OUTPATIENT
Start: 2024-11-25 | End: 2025-02-23

## 2024-11-25 RX ORDER — TRAZODONE HYDROCHLORIDE 100 MG/1
100 TABLET ORAL NIGHTLY
Qty: 90 TABLET | Refills: 0 | Status: SHIPPED | OUTPATIENT
Start: 2024-11-25 | End: 2025-02-23

## 2024-11-25 ASSESSMENT — ENCOUNTER SYMPTOMS
BLOOD IN STOOL: 0
ABDOMINAL PAIN: 0
SHORTNESS OF BREATH: 0
VOMITING: 0
POLYPHAGIA: 0
DYSURIA: 0
CONSTIPATION: 0
MYALGIAS: 0
DIFFICULTY URINATING: 0
NAUSEA: 0
HEADACHES: 0
FATIGUE: 0
DIZZINESS: 0
DIARRHEA: 0
POLYDIPSIA: 0
PALPITATIONS: 0

## 2024-11-25 NOTE — PROGRESS NOTES
Subjective   Patient ID: Pamela Clements is a 69 y.o. female who presents for Hyperlipidemia, Anxiety, and Prediabetes (Recheck, review labs.) and multiple issues    Hyperlipidemia  Pertinent negatives include no chest pain, myalgias or shortness of breath.   Anxiety  Patient reports no chest pain, dizziness, nausea, palpitations or shortness of breath.          Lipids: Worsening.  HDL 68,  (231) triglycerides 271 (249).  Reports worsening diet due to issues with her teeth. Taking statin as directed  Diabetes: New onset.  A1c 6.6.  Reports poor diet as discussed  Vitamin D: Low.  16 (21)  Mood: Worsening.  Had to stop duloxetine due to chronic diarrhea which resolved off med.  Wants to restart citalopram.  Helped in the past.  Reports both anxiety and depression.  No SI/HI.  Appetite up  Insomnia: Not sleeping well.  Would like to increase med  Neuropathy: Stable on med    Review of Systems   Constitutional:  Negative for fatigue.   Eyes:  Negative for visual disturbance.   Respiratory:  Negative for shortness of breath.    Cardiovascular:  Negative for chest pain and palpitations.   Gastrointestinal:  Negative for abdominal pain, blood in stool, constipation, diarrhea, nausea and vomiting.   Endocrine: Negative for cold intolerance, heat intolerance, polydipsia, polyphagia and polyuria.   Genitourinary:  Negative for difficulty urinating and dysuria.   Musculoskeletal:  Negative for myalgias.   Skin:  Negative for rash.   Neurological:  Negative for dizziness and headaches.       Objective   /70   Pulse 74   Temp 36.2 °C (97.1 °F)   Wt 68 kg (150 lb)   SpO2 98%   BMI 25.75 kg/m²     Physical Exam  Vitals and nursing note reviewed.   Constitutional:       General: She is not in acute distress.     Appearance: Normal appearance. She is not toxic-appearing.   HENT:      Head: Normocephalic.      Mouth/Throat:      Pharynx: Oropharynx is clear.   Eyes:      General: No scleral icterus.     Pupils:  Pupils are equal, round, and reactive to light.   Neck:      Vascular: No carotid bruit.   Cardiovascular:      Rate and Rhythm: Normal rate and regular rhythm.      Heart sounds: No murmur heard.  Pulmonary:      Effort: Pulmonary effort is normal. No respiratory distress.      Breath sounds: Normal breath sounds.   Abdominal:      Palpations: Abdomen is soft.      Tenderness: There is no abdominal tenderness. There is no guarding.   Musculoskeletal:         General: No tenderness.      Cervical back: Neck supple.      Right lower leg: No edema.      Left lower leg: No edema.   Lymphadenopathy:      Cervical: No cervical adenopathy.   Skin:     General: Skin is warm.   Neurological:      General: No focal deficit present.      Mental Status: She is alert.      Cranial Nerves: No cranial nerve deficit.   Psychiatric:         Mood and Affect: Mood normal.         Assessment/Plan   Problem List Items Addressed This Visit             ICD-10-CM    Mixed hyperlipidemia E78.2    Relevant Medications    rosuvastatin (Crestor) 40 mg tablet    Neuropathy G62.9    Relevant Medications    gabapentin (Neurontin) 300 mg capsule    Vitamin D deficiency E55.9    Insomnia G47.00    Relevant Medications    traZODone (Desyrel) 100 mg tablet     Other Visit Diagnoses         Codes    Mild episode of recurrent major depressive disorder (CMS-HCC)    -  Primary F33.0    Relevant Medications    citalopram (CeleXA) 20 mg tablet    Type 2 diabetes mellitus with other specified complication, without long-term current use of insulin     E11.69    Anxiety     F41.9    Relevant Medications    citalopram (CeleXA) 20 mg tablet        Discussed blood work.  Recommendations given.  Consider Wellbutrin if has issues with citalopram.  Declines flu vaccine.  Discussed risk given that she is a smoker.  Patient declines

## 2024-11-25 NOTE — PATIENT INSTRUCTIONS
Recommend a predominant low fat whole foods plant based diet.  Cut back on meat, dairy, processed carbs, salt and oils(especially palm and coconut). Increase fiber in your diet.  Decrease alcohol as much as possible if you drink. Recommend regular exercise most days of the week(goal up to 150min per week). Also recommend good sleep habits aiming for 7-8 hours per night.     Switch to rosuvastatin. Take with coq10 100-200mg    Restart citalopram    Increase trazodone    Continue your other med    Return in 4-6 weeks, sooner if needed

## 2024-11-26 ENCOUNTER — APPOINTMENT (OUTPATIENT)
Dept: GASTROENTEROLOGY | Facility: CLINIC | Age: 69
End: 2024-11-26
Payer: MEDICARE

## 2024-11-26 DIAGNOSIS — K52.832 LYMPHOCYTIC COLITIS: ICD-10-CM

## 2024-11-26 RX ORDER — BUDESONIDE 3 MG/1
CAPSULE, COATED PELLETS ORAL
Qty: 90 CAPSULE | Refills: 2 | OUTPATIENT
Start: 2024-11-26 | End: 2025-02-17

## 2024-12-26 DIAGNOSIS — M25.551 BILATERAL HIP PAIN: Primary | ICD-10-CM

## 2024-12-26 DIAGNOSIS — M25.552 BILATERAL HIP PAIN: Primary | ICD-10-CM

## 2024-12-31 ENCOUNTER — APPOINTMENT (OUTPATIENT)
Dept: ORTHOPEDIC SURGERY | Facility: CLINIC | Age: 69
End: 2024-12-31
Payer: MEDICARE

## 2024-12-31 ENCOUNTER — HOSPITAL ENCOUNTER (OUTPATIENT)
Dept: RADIOLOGY | Facility: CLINIC | Age: 69
Discharge: HOME | End: 2024-12-31
Payer: MEDICARE

## 2024-12-31 VITALS — WEIGHT: 150 LBS | HEIGHT: 64 IN | BODY MASS INDEX: 25.61 KG/M2

## 2024-12-31 DIAGNOSIS — M25.552 BILATERAL HIP PAIN: ICD-10-CM

## 2024-12-31 DIAGNOSIS — M25.551 BILATERAL HIP PAIN: ICD-10-CM

## 2024-12-31 DIAGNOSIS — M16.0 PRIMARY OSTEOARTHRITIS OF BOTH HIPS: Primary | ICD-10-CM

## 2024-12-31 PROCEDURE — 1123F ACP DISCUSS/DSCN MKR DOCD: CPT | Performed by: ORTHOPAEDIC SURGERY

## 2024-12-31 PROCEDURE — 3008F BODY MASS INDEX DOCD: CPT | Performed by: ORTHOPAEDIC SURGERY

## 2024-12-31 PROCEDURE — 1159F MED LIST DOCD IN RCRD: CPT | Performed by: ORTHOPAEDIC SURGERY

## 2024-12-31 PROCEDURE — 73522 X-RAY EXAM HIPS BI 3-4 VIEWS: CPT

## 2024-12-31 PROCEDURE — 99204 OFFICE O/P NEW MOD 45 MIN: CPT | Performed by: ORTHOPAEDIC SURGERY

## 2024-12-31 ASSESSMENT — PAIN - FUNCTIONAL ASSESSMENT: PAIN_FUNCTIONAL_ASSESSMENT: NO/DENIES PAIN

## 2024-12-31 NOTE — PROGRESS NOTES
PRIMARY CARE PHYSICIAN: Iveth Johnson DO  REFERRING PROVIDER: No referring provider defined for this encounter.     CONSULT ORTHOPAEDIC: Hip Evaluation        ASSESSMENT & PLAN    IMPRESSION:  1.  Primary arthritis, bilateral hips    PLAN:  Discussed the patient findings above, reviewed her current x-rays and imaging findings with her.  She does have severe arthritis of bilateral hips and is currently failed conservative treatment.  She would like to proceed with a right hip replacement.  We did review her chart and noted that she has a positive finding of hepatitis C and has not undergone antiviral treatment.  We did review that her joints literature does support antiviral treatment prior to surgery due to increased risk of postoperative prosthetic joint infection.  I discussed that I am going to reach out to her primary care physician to coordinate care for this is a not sure how to coordinate placement who directs the treatment.  Will contact her once our conversation is done with Dr. Johnson.  If her treatment is going to be delayed we discussed the option of trying intra-articular corticosteroid injection for temporary pain control which she is understanding of.      SUBJECTIVE  CHIEF COMPLAINT:   Chief Complaint   Patient presents with    Left Hip - Pain    Right Hip - Pain        HPI: Navya Clements is a 69 y.o. patient. Navya Clements has had progressive problems with both hips over the past 2 years. They do not report any trauma. They do report any constant or progressive numbness or tingling in their legs.  Feels numbness on right side of thigh. Their symptoms are interfering with activities which include B/L posterior sided hip pain and anterior lateral hip pain, and instability. XR done today.     FUNCTIONAL STATUS: limited:  unable to perform activities of daily living.  AMBULATORY STATUS:  independent  PREVIOUS TREATMENTS: NSAIDS Advil with no improvement, completed  Gabapentin, still taking,  with no improvement  HISTORY OF SURGERY ON AFFECTED HIP(S): No   BACK PAIN REPORTED: Yes       REVIEW OF SYSTEMS  Constitutional: See HPI for pain assessment, No significant weight loss, recent trauma  Cardiovascular: No chest pain, shortness of breath  Respiratory: No difficulty breathing, cough  Gastrointestinal: No nausea, vomiting, diarrhea, constipation  Musculoskeletal: Noted in HPI, positive for pain, restricted motion, stiffness  Integumentary: No rashes, easy bruising, redness   Neurological: no numbness or tingling in extremities, no gait disturbances   Psychiatric: No mood changes, memory changes, social issues  Heme/Lymph: no excessive swelling, easy bruising, excessive bleeding  ENT: No hearing changes  Eyes: No vision changes    Past Medical History:   Diagnosis Date    Hx antineoplastic chemo     Hyperlipidemia     Personal history of irradiation         No Known Allergies     Past Surgical History:   Procedure Laterality Date    BREAST BIOPSY Right     MASTECTOMY Left         Family History   Problem Relation Name Age of Onset    Breast cancer Paternal Grandmother          Social History     Socioeconomic History    Marital status: Single     Spouse name: Not on file    Number of children: Not on file    Years of education: Not on file    Highest education level: Not on file   Occupational History    Not on file   Tobacco Use    Smoking status: Every Day     Current packs/day: 0.50     Types: Cigarettes    Smokeless tobacco: Never   Vaping Use    Vaping status: Never Used   Substance and Sexual Activity    Alcohol use: Yes     Alcohol/week: 2.0 standard drinks of alcohol     Types: 2 Standard drinks or equivalent per week     Comment: occasionally    Drug use: Never    Sexual activity: Not on file   Other Topics Concern    Not on file   Social History Narrative    Not on file     Social Drivers of Health     Financial Resource Strain: Low Risk  (10/11/2023)    [facility]     Overall Financial Resource Strain (CARDIA)     Difficulty of Paying Living Expenses: Not very hard   Food Insecurity: Food Insecurity Present (10/11/2023)    Received from [facility]    Hunger Vital Sign     Worried About Running Out of Food in the Last Year: Never true     Ran Out of Food in the Last Year: Sometimes true   Transportation Needs: No Transportation Needs (10/11/2023)    Received from [facility]    PRAPARE - Transportation     Lack of Transportation (Medical): No     Lack of Transportation (Non-Medical): No   Physical Activity: Sufficiently Active (10/11/2023)    Received from [facility]    Exercise Vital Sign     Days of Exercise per Week: 7 days     Minutes of Exercise per Session: 60 min   Stress: No Stress Concern Present (10/11/2023)    Received from [facility]    [place] [facility] [employer] - Occupational Stress Questionnaire     Feeling of Stress : Only a little   Social Connections: Socially Isolated (10/11/2023)    Received from [facility]    Social Connection and Isolation Panel [NHANES]     Frequency of Communication with Friends and Family: More than three times a week     Frequency of Social Gatherings with Friends and Family: Once a week     Attends [employer] Services: Never     Active Member of Clubs or Organizations: No     Attends Club or Organization Meetings: Never     Marital Status:    Intimate Partner Violence: Not At Risk (10/11/2023)    Received from [facility]    Humiliation, Afraid, Rape, and Kick questionnaire     Fear of Current or Ex-Partner: No     Emotionally Abused: No     Physically Abused: No     Sexually Abused: No   Housing Stability: High Risk (10/11/2023)    Received from [facility]    Housing Stability Vital Sign     Unable to Pay for Housing in the Last Year: No     Number of Places Lived in the Last Year: 3     Unstable Housing in the Last  "Year: No        CURRENT MEDICATIONS:   Current Outpatient Medications   Medication Sig Dispense Refill    citalopram (CeleXA) 20 mg tablet Take 1 tablet (20 mg) by mouth once daily. 30 tablet 1    ergocalciferol (Vitamin D-2) 1.25 MG (61820 UT) capsule Take 1 capsule (50,000 Units) by mouth 1 (one) time per week. 4 capsule 1    fluticasone (Flonase) 50 mcg/actuation nasal spray Administer 1 spray into each nostril once daily. Shake gently. Before first use, prime pump. After use, clean tip and replace cap. 16 g 0    gabapentin (Neurontin) 300 mg capsule Take 1 capsule (300 mg) by mouth 3 times a day. 90 capsule 0    rosuvastatin (Crestor) 40 mg tablet Take 1 tablet (40 mg) by mouth once daily. 100 tablet 3    traZODone (Desyrel) 100 mg tablet Take 1 tablet (100 mg) by mouth once daily at bedtime. 90 tablet 0     No current facility-administered medications for this visit.        OBJECTIVE    PHYSICAL EXAM      5/22/2024    12:25 PM 5/31/2024    10:14 AM 6/4/2024     3:03 PM 7/25/2024     4:29 PM 8/22/2024     9:52 AM 11/25/2024    11:23 AM 12/31/2024    10:14 AM   Vitals   Systolic 124 130 122 142 112 122    Diastolic 78 86 82 80 72 70    BP Location  Left arm        Heart Rate 67 75 72 72 72 74    Temp 35.6 °C (96 °F)  36.4 °C (97.6 °F)  36.2 °C (97.1 °F) 36.2 °C (97.1 °F)    Height       1.626 m (5' 4\")   Weight (lb) 145 148 150 151 152 150 150   BMI 24.89 kg/m2 25.4 kg/m2 25.75 kg/m2 25.92 kg/m2 26.09 kg/m2 25.75 kg/m2 25.75 kg/m2   BSA (m2) 1.72 m2 1.74 m2 1.75 m2 1.76 m2 1.76 m2 1.75 m2 1.75 m2   Visit Report Report Report Report Report Report Report Report      Body mass index is 25.75 kg/m².    GENERAL: A/Ox3, NAD. Appears healthy, well nourished  PSYCHIATRIC: Mood stable, appropriate memory recall  EYES: EOM intact, no scleral icterus  CARDIAC: regular rate  LUNGS: Breathing non-labored  SKIN: no erythema, rashes, or ecchymoses     MUSCULOSKELETAL:  Laterality: bilateral Hip Exam  - ROM, Extension: full, no " flexion contracture  - Strength: Abduction 5/5 against resitance, Flexion 5/5  - Palpation: mild TTP along greater trochanter  - Log roll/IR exam: painful and limited motion. Pain with hip flexion past 90 degrees and internal rotation  - Straight leg raise: negative  - EHL/PF/DF motor intact  - Gait: antalgic to arthritic side, negative Trendelenburg gait   - Special Tests: none performed    NEUROVASCULAR:  - Neurovascular Status: sensation intact to light touch distally  - Capillary refill brisk at extremities, Bilateral dorsalis pedis pulse 2+           IMAGING:  Multiple views of the affected bilateral hip(s) demonstrate: Severe arthritis of bilateral hips with joint space narrowing, subchondral sclerosis, calcification of the fat pad, head neck junction osteophytes.   X-rays were personally reviewed and interpreted by me.  Radiology reports were reviewed by me as well, if readily available at the time.        Gita Amaro DO  Attending Surgeon  Joint Replacement and Adult Reconstructive Surgery  [place], [place]

## 2024-12-31 NOTE — LETTER
December 31, 2024     Iveth Johnson DO  [address]  [address] [address]    Patient: Pamela Clements   YOB: 1955   Date of Visit: 12/31/2024       Dear Dr. Iveth Johnson DO:    Thank you for referring Pamela Clements to me for evaluation. Below are my notes for this consultation.  If you have questions, please do not hesitate to call me. I look forward to following your patient along with you.       Sincerely,     Gita Amaro DO      CC: No Recipients  ______________________________________________________________________________________    PRIMARY CARE PHYSICIAN: Iveth Johnson DO  REFERRING PROVIDER: No referring provider defined for this encounter.     CONSULT ORTHOPAEDIC: Hip Evaluation        ASSESSMENT & PLAN    IMPRESSION:  1.  Primary arthritis, bilateral hips    PLAN:  Discussed the patient findings above, reviewed her current x-rays and imaging findings with her.  She does have severe arthritis of bilateral hips and is currently failed conservative treatment.  She would like to proceed with a right hip replacement.  We did review her chart and noted that she has a positive finding of hepatitis C and has not undergone antiviral treatment.  We did review that her joints literature does support antiviral treatment prior to surgery due to increased risk of postoperative prosthetic joint infection.  I discussed that I am going to reach out to her primary care physician to coordinate care for this is a not sure how to coordinate placement who directs the treatment.  Will contact her once our conversation is done with Dr. Johnson.  If her treatment is going to be delayed we discussed the option of trying intra-articular corticosteroid injection for temporary pain control which she is understanding of.      SUBJECTIVE  CHIEF COMPLAINT:   Chief Complaint   Patient presents with   • Left Hip - Pain   • Right Hip - Pain        HPI: Navya Clements is a 69 y.o. patient. Navya MORAN  Tyree has had progressive problems with both hips over the past 2 years. They do not report any trauma. They do report any constant or progressive numbness or tingling in their legs.  Feels numbness on right side of thigh. Their symptoms are interfering with activities which include B/L posterior sided hip pain and anterior lateral hip pain, and instability. XR done today.     FUNCTIONAL STATUS: limited:  unable to perform activities of daily living.  AMBULATORY STATUS:  independent  PREVIOUS TREATMENTS: NSAIDS Advil with no improvement, completed  Gabapentin, still taking, with no improvement  HISTORY OF SURGERY ON AFFECTED HIP(S): No   BACK PAIN REPORTED: Yes       REVIEW OF SYSTEMS  Constitutional: See HPI for pain assessment, No significant weight loss, recent trauma  Cardiovascular: No chest pain, shortness of breath  Respiratory: No difficulty breathing, cough  Gastrointestinal: No nausea, vomiting, diarrhea, constipation  Musculoskeletal: Noted in HPI, positive for pain, restricted motion, stiffness  Integumentary: No rashes, easy bruising, redness   Neurological: no numbness or tingling in extremities, no gait disturbances   Psychiatric: No mood changes, memory changes, social issues  Heme/Lymph: no excessive swelling, easy bruising, excessive bleeding  ENT: No hearing changes  Eyes: No vision changes    Past Medical History:   Diagnosis Date   • Hx antineoplastic chemo    • Hyperlipidemia    • Personal history of irradiation         No Known Allergies     Past Surgical History:   Procedure Laterality Date   • BREAST BIOPSY Right    • MASTECTOMY Left         Family History   Problem Relation Name Age of Onset   • Breast cancer Paternal Grandmother          Social History     Socioeconomic History   • Marital status: Single     Spouse name: Not on file   • Number of children: Not on file   • Years of education: Not on file   • Highest education level: Not on file   Occupational History   • Not on file    Tobacco Use   • Smoking status: Every Day     Current packs/day: 0.50     Types: Cigarettes   • Smokeless tobacco: Never   Vaping Use   • Vaping status: Never Used   Substance and Sexual Activity   • Alcohol use: Yes     Alcohol/week: 2.0 standard drinks of alcohol     Types: 2 Standard drinks or equivalent per week     Comment: occasionally   • Drug use: Never   • Sexual activity: Not on file   Other Topics Concern   • Not on file   Social History Narrative   • Not on file     Social Drivers of Health     Financial Resource Strain: Low Risk  (10/11/2023)    Received from [facility]    Overall Financial Resource Strain (CARDIA)    • Difficulty of Paying Living Expenses: Not very hard   Food Insecurity: Food Insecurity Present (10/11/2023)    Received from [facility]    Hunger Vital Sign    • Worried About Running Out of Food in the Last Year: Never true    • Ran Out of Food in the Last Year: Sometimes true   Transportation Needs: No Transportation Needs (10/11/2023)    Received from [facility]    PRAPARE - Transportation    • Lack of Transportation (Medical): No    • Lack of Transportation (Non-Medical): No   Physical Activity: Sufficiently Active (10/11/2023)    Received from [facility]    Exercise Vital Sign    • Days of Exercise per Week: 7 days    • Minutes of Exercise per Session: 60 min   Stress: No Stress Concern Present (10/11/2023)    Received from [facility]    [place] [employer] - Occupational Stress Questionnaire    • Feeling of Stress : Only a little   Social Connections: Socially Isolated (10/11/2023)    Received from [facility]    Social Connection and Isolation Panel [NHANES]    • Frequency of Communication with Friends and Family: More than three times a week    • Frequency of Social Gatherings with Friends and Family: Once a week    • Attends Jainism Services: Never    • Active Member of  Clubs or Organizations: No    • Attends Club or Organization Meetings: Never    • Marital Status:    Intimate Partner Violence: Not At Risk (10/11/2023)    Received from [facility]    Humiliation, Afraid, Rape, and Kick questionnaire    • Fear of Current or Ex-Partner: No    • Emotionally Abused: No    • Physically Abused: No    • Sexually Abused: No   Housing Stability: High Risk (10/11/2023)    Received from [facility]    Housing Stability Vital Sign    • Unable to Pay for Housing in the Last Year: No    • Number of Places Lived in the Last Year: 3    • Unstable Housing in the Last Year: No        CURRENT MEDICATIONS:   Current Outpatient Medications   Medication Sig Dispense Refill   • citalopram (CeleXA) 20 mg tablet Take 1 tablet (20 mg) by mouth once daily. 30 tablet 1   • ergocalciferol (Vitamin D-2) 1.25 MG (03836 UT) capsule Take 1 capsule (50,000 Units) by mouth 1 (one) time per week. 4 capsule 1   • fluticasone (Flonase) 50 mcg/actuation nasal spray Administer 1 spray into each nostril once daily. Shake gently. Before first use, prime pump. After use, clean tip and replace cap. 16 g 0   • gabapentin (Neurontin) 300 mg capsule Take 1 capsule (300 mg) by mouth 3 times a day. 90 capsule 0   • rosuvastatin (Crestor) 40 mg tablet Take 1 tablet (40 mg) by mouth once daily. 100 tablet 3   • traZODone (Desyrel) 100 mg tablet Take 1 tablet (100 mg) by mouth once daily at bedtime. 90 tablet 0     No current facility-administered medications for this visit.        OBJECTIVE    PHYSICAL EXAM      5/22/2024    12:25 PM 5/31/2024    10:14 AM 6/4/2024     3:03 PM 7/25/2024     4:29 PM 8/22/2024     9:52 AM 11/25/2024    11:23 AM 12/31/2024    10:14 AM   Vitals   Systolic 124 130 122 142 112 122    Diastolic 78 86 82 80 72 70    BP Location  Left arm        Heart Rate 67 75 72 72 72 74    Temp 35.6 °C (96 °F)  36.4 °C (97.6 °F)  36.2 °C (97.1 °F) 36.2 °C (97.1 °F)    Height       1.620  "m (5' 4\")   Weight (lb) 145 148 150 151 152 150 150   BMI 24.89 kg/m2 25.4 kg/m2 25.75 kg/m2 25.92 kg/m2 26.09 kg/m2 25.75 kg/m2 25.75 kg/m2   BSA (m2) 1.72 m2 1.74 m2 1.75 m2 1.76 m2 1.76 m2 1.75 m2 1.75 m2   Visit Report Report Report Report Report Report Report Report      Body mass index is 25.75 kg/m².    GENERAL: A/Ox3, NAD. Appears healthy, well nourished  PSYCHIATRIC: Mood stable, appropriate memory recall  EYES: EOM intact, no scleral icterus  CARDIAC: regular rate  LUNGS: Breathing non-labored  SKIN: no erythema, rashes, or ecchymoses     MUSCULOSKELETAL:  Laterality: bilateral Hip Exam  - ROM, Extension: full, no flexion contracture  - Strength: Abduction 5/5 against resitance, Flexion 5/5  - Palpation: mild TTP along greater trochanter  - Log roll/IR exam: painful and limited motion. Pain with hip flexion past 90 degrees and internal rotation  - Straight leg raise: negative  - EHL/PF/DF motor intact  - Gait: antalgic to arthritic side, negative Trendelenburg gait   - Special Tests: none performed    NEUROVASCULAR:  - Neurovascular Status: sensation intact to light touch distally  - Capillary refill brisk at extremities, Bilateral dorsalis pedis pulse 2+           IMAGING:  Multiple views of the affected bilateral hip(s) demonstrate: Severe arthritis of bilateral hips with joint space narrowing, subchondral sclerosis, calcification of the fat pad, head neck junction osteophytes.   X-rays were personally reviewed and interpreted by me.  Radiology reports were reviewed by me as well, if readily available at the time.        Gita Amaro DO  Attending Surgeon  Joint Replacement and Adult Reconstructive Surgery  [place], [place]                        "

## 2025-01-07 ENCOUNTER — APPOINTMENT (OUTPATIENT)
Dept: PRIMARY CARE | Facility: CLINIC | Age: 70
End: 2025-01-07
Payer: MEDICARE

## 2025-01-10 ENCOUNTER — TELEPHONE (OUTPATIENT)
Dept: PRIMARY CARE | Facility: CLINIC | Age: 70
End: 2025-01-10
Payer: MEDICARE

## 2025-01-10 DIAGNOSIS — R76.8 HEPATITIS C ANTIBODY TEST POSITIVE: Primary | ICD-10-CM

## 2025-01-10 DIAGNOSIS — B18.2 CHRONIC HEPATITIS C WITHOUT HEPATIC COMA (MULTI): ICD-10-CM

## 2025-01-10 NOTE — TELEPHONE ENCOUNTER
----- Message from Iveth Johnson sent at 1/10/2025 10:08 AM EST -----  Pls tell pt that she was referred to liver specialist per ortho recommendations before her surgery

## 2025-01-10 NOTE — TELEPHONE ENCOUNTER
Called pt and pt informed of provider message and that central scheduling will reach out to schedule

## 2025-01-14 ENCOUNTER — APPOINTMENT (OUTPATIENT)
Dept: PRIMARY CARE | Facility: CLINIC | Age: 70
End: 2025-01-14
Payer: MEDICARE

## 2025-01-15 DIAGNOSIS — S82.892A CLOSED LEFT ANKLE FRACTURE, INITIAL ENCOUNTER: ICD-10-CM

## 2025-01-16 ENCOUNTER — HOSPITAL ENCOUNTER (OUTPATIENT)
Dept: RADIOLOGY | Facility: HOSPITAL | Age: 70
Discharge: HOME | End: 2025-01-16
Payer: MEDICARE

## 2025-01-16 ENCOUNTER — OFFICE VISIT (OUTPATIENT)
Dept: ORTHOPEDIC SURGERY | Facility: HOSPITAL | Age: 70
End: 2025-01-16
Payer: MEDICARE

## 2025-01-16 VITALS — BODY MASS INDEX: 25.61 KG/M2 | HEIGHT: 64 IN | WEIGHT: 150 LBS

## 2025-01-16 DIAGNOSIS — S82.62XA CLOSED LOW LATERAL MALLEOLUS FRACTURE, LEFT, INITIAL ENCOUNTER: Primary | ICD-10-CM

## 2025-01-16 DIAGNOSIS — S82.892A CLOSED LEFT ANKLE FRACTURE, INITIAL ENCOUNTER: ICD-10-CM

## 2025-01-16 PROCEDURE — 99214 OFFICE O/P EST MOD 30 MIN: CPT | Performed by: SPECIALIST

## 2025-01-16 PROCEDURE — 1123F ACP DISCUSS/DSCN MKR DOCD: CPT | Performed by: SPECIALIST

## 2025-01-16 PROCEDURE — 3008F BODY MASS INDEX DOCD: CPT | Performed by: SPECIALIST

## 2025-01-16 PROCEDURE — 1159F MED LIST DOCD IN RCRD: CPT | Performed by: SPECIALIST

## 2025-01-16 PROCEDURE — 73610 X-RAY EXAM OF ANKLE: CPT | Mod: LT

## 2025-01-16 NOTE — PROGRESS NOTES
Npv  Left Fibula Fracture DOI 1/10/25  XR done today   Patient was using the restroom twisted the wrong way rolling her ankle causing injury seen at [facility] placed in boot.  Patient states this is an isolated injury and has been walking on it since the injury.    Exam: Patient alert and oriented x 3 no acute distress.  Left ankle with moderate swelling and ecchymosis.  Pain isolated over the lateral malleolus.  No medial ankle pain no Lisfranc or proximal leg region pain.  Dermis intact neurovascular intact good stable range of motion active of the ankle.  No external deformity.    Radiographs: 3 view standing show a stable ankle mortise on the left with a minimally displaced lateral malleolus ankle fracture.  No bony signs of healing at this stage.    Assessment and plan: Left lateral malleolus ankle fracture.  This is a stable type treated nonoperatively in a walking cast.  She understands it would be 6 or more weeks for bone healing.  Will see her back in 6 weeks for 3 view standing x-rays of the left ankle.

## 2025-01-27 DIAGNOSIS — F41.9 ANXIETY: ICD-10-CM

## 2025-01-27 DIAGNOSIS — G62.9 NEUROPATHY: ICD-10-CM

## 2025-01-27 DIAGNOSIS — F33.0 MILD EPISODE OF RECURRENT MAJOR DEPRESSIVE DISORDER (CMS-HCC): ICD-10-CM

## 2025-01-27 RX ORDER — CITALOPRAM 20 MG/1
20 TABLET, FILM COATED ORAL DAILY
Qty: 30 TABLET | Refills: 0 | Status: SHIPPED | OUTPATIENT
Start: 2025-01-27 | End: 2025-02-05 | Stop reason: SDUPTHER

## 2025-01-27 RX ORDER — GABAPENTIN 300 MG/1
300 CAPSULE ORAL 3 TIMES DAILY
Qty: 90 CAPSULE | Refills: 0 | Status: SHIPPED | OUTPATIENT
Start: 2025-01-27 | End: 2025-02-05 | Stop reason: SDUPTHER

## 2025-01-27 NOTE — TELEPHONE ENCOUNTER
Pt called requesting RF on Gabapentin and Citalopram.  Next OV 2/5/25  Pt out before OV  Ok for RF?  Please advise. Thanks. SAHIL

## 2025-02-05 ENCOUNTER — APPOINTMENT (OUTPATIENT)
Dept: PRIMARY CARE | Facility: CLINIC | Age: 70
End: 2025-02-05
Payer: MEDICARE

## 2025-02-05 VITALS
DIASTOLIC BLOOD PRESSURE: 74 MMHG | WEIGHT: 151 LBS | TEMPERATURE: 97.2 F | SYSTOLIC BLOOD PRESSURE: 120 MMHG | BODY MASS INDEX: 25.16 KG/M2 | HEIGHT: 65 IN | HEART RATE: 72 BPM

## 2025-02-05 DIAGNOSIS — F41.9 ANXIETY: ICD-10-CM

## 2025-02-05 DIAGNOSIS — M32.19 OTHER SYSTEMIC LUPUS ERYTHEMATOSUS WITH OTHER ORGAN INVOLVEMENT: ICD-10-CM

## 2025-02-05 DIAGNOSIS — Z00.00 ROUTINE GENERAL MEDICAL EXAMINATION AT HEALTH CARE FACILITY: Primary | ICD-10-CM

## 2025-02-05 DIAGNOSIS — C50.812 MALIGNANT NEOPLASM OF OVERLAPPING SITES OF LEFT FEMALE BREAST, UNSPECIFIED ESTROGEN RECEPTOR STATUS: ICD-10-CM

## 2025-02-05 DIAGNOSIS — Z71.6 TOBACCO ABUSE COUNSELING: ICD-10-CM

## 2025-02-05 DIAGNOSIS — F33.0 MILD EPISODE OF RECURRENT MAJOR DEPRESSIVE DISORDER (CMS-HCC): ICD-10-CM

## 2025-02-05 DIAGNOSIS — E78.2 MIXED HYPERLIPIDEMIA: ICD-10-CM

## 2025-02-05 DIAGNOSIS — G47.00 INSOMNIA, UNSPECIFIED TYPE: ICD-10-CM

## 2025-02-05 DIAGNOSIS — Z72.0 TOBACCO USE: ICD-10-CM

## 2025-02-05 DIAGNOSIS — B18.2 CHRONIC HEPATITIS C WITHOUT HEPATIC COMA (MULTI): ICD-10-CM

## 2025-02-05 DIAGNOSIS — G62.9 NEUROPATHY: ICD-10-CM

## 2025-02-05 DIAGNOSIS — E11.69 TYPE 2 DIABETES MELLITUS WITH OTHER SPECIFIED COMPLICATION, WITHOUT LONG-TERM CURRENT USE OF INSULIN: ICD-10-CM

## 2025-02-05 LAB
POC ALBUMIN /CREATININE RATIO MANUALLY ENTERED: ABNORMAL UG/MG CREAT
POC URINE ALBUMIN: 150 MG/L
POC URINE CREATININE: 300 MG/DL

## 2025-02-05 PROCEDURE — 3074F SYST BP LT 130 MM HG: CPT | Performed by: FAMILY MEDICINE

## 2025-02-05 PROCEDURE — 1160F RVW MEDS BY RX/DR IN RCRD: CPT | Performed by: FAMILY MEDICINE

## 2025-02-05 PROCEDURE — 1158F ADVNC CARE PLAN TLK DOCD: CPT | Performed by: FAMILY MEDICINE

## 2025-02-05 PROCEDURE — 99397 PER PM REEVAL EST PAT 65+ YR: CPT | Performed by: FAMILY MEDICINE

## 2025-02-05 PROCEDURE — G0439 PPPS, SUBSEQ VISIT: HCPCS | Performed by: FAMILY MEDICINE

## 2025-02-05 PROCEDURE — G0446 INTENS BEHAVE THER CARDIO DX: HCPCS | Performed by: FAMILY MEDICINE

## 2025-02-05 PROCEDURE — 1123F ACP DISCUSS/DSCN MKR DOCD: CPT | Performed by: FAMILY MEDICINE

## 2025-02-05 PROCEDURE — 99214 OFFICE O/P EST MOD 30 MIN: CPT | Performed by: FAMILY MEDICINE

## 2025-02-05 PROCEDURE — 82044 UR ALBUMIN SEMIQUANTITATIVE: CPT | Performed by: FAMILY MEDICINE

## 2025-02-05 PROCEDURE — 1159F MED LIST DOCD IN RCRD: CPT | Performed by: FAMILY MEDICINE

## 2025-02-05 PROCEDURE — 3008F BODY MASS INDEX DOCD: CPT | Performed by: FAMILY MEDICINE

## 2025-02-05 PROCEDURE — 1170F FXNL STATUS ASSESSED: CPT | Performed by: FAMILY MEDICINE

## 2025-02-05 PROCEDURE — 3078F DIAST BP <80 MM HG: CPT | Performed by: FAMILY MEDICINE

## 2025-02-05 PROCEDURE — 99406 BEHAV CHNG SMOKING 3-10 MIN: CPT | Performed by: FAMILY MEDICINE

## 2025-02-05 RX ORDER — ATORVASTATIN CALCIUM 40 MG/1
TABLET, FILM COATED ORAL
COMMUNITY
Start: 2024-08-22 | End: 2025-02-05 | Stop reason: WASHOUT

## 2025-02-05 RX ORDER — CITALOPRAM 20 MG/1
20 TABLET, FILM COATED ORAL DAILY
Qty: 90 TABLET | Refills: 0 | Status: SHIPPED | OUTPATIENT
Start: 2025-02-05 | End: 2025-05-06

## 2025-02-05 RX ORDER — GABAPENTIN 300 MG/1
300 CAPSULE ORAL 2 TIMES DAILY
Qty: 180 CAPSULE | Refills: 0 | Status: SHIPPED | OUTPATIENT
Start: 2025-02-05 | End: 2025-05-06

## 2025-02-05 RX ORDER — TRAZODONE HYDROCHLORIDE 100 MG/1
100 TABLET ORAL NIGHTLY
Qty: 90 TABLET | Refills: 0 | Status: SHIPPED | OUTPATIENT
Start: 2025-02-05 | End: 2025-05-06

## 2025-02-05 RX ORDER — BUPROPION HYDROCHLORIDE 150 MG/1
150 TABLET, EXTENDED RELEASE ORAL 2 TIMES DAILY
Qty: 180 TABLET | Refills: 0 | Status: SHIPPED | OUTPATIENT
Start: 2025-02-05 | End: 2025-05-06

## 2025-02-05 ASSESSMENT — ACTIVITIES OF DAILY LIVING (ADL)
MANAGING_FINANCES: INDEPENDENT
GROCERY_SHOPPING: INDEPENDENT
DRESSING: INDEPENDENT
BATHING: INDEPENDENT
TAKING_MEDICATION: INDEPENDENT
DOING_HOUSEWORK: INDEPENDENT

## 2025-02-05 ASSESSMENT — ENCOUNTER SYMPTOMS
DIZZINESS: 0
SHORTNESS OF BREATH: 0
PALPITATIONS: 0
DYSURIA: 0
VOMITING: 0
SLEEP DISTURBANCE: 0
DIARRHEA: 0
HEADACHES: 0
ABDOMINAL DISTENTION: 0
NAUSEA: 0
CONSTIPATION: 0
POLYDIPSIA: 0
FATIGUE: 0
DIFFICULTY URINATING: 0
ABDOMINAL PAIN: 0
MYALGIAS: 0
DYSPHORIC MOOD: 0
POLYPHAGIA: 0
BLOOD IN STOOL: 0

## 2025-02-05 NOTE — PROGRESS NOTES
Subjective   Reason for Visit: Navya Clements is an 69 y.o. female here for a Medicare Wellness visit, CPE and multiple issues.     Past Medical, Surgical, and Family History reviewed and updated in chart.    Reviewed all medications by prescribing practitioner or clinical pharmacist (such as prescriptions, OTCs, herbal therapies and supplements) and documented in the medical record.    HPI    Patient Care Team:  Iveth Johnson DO as PCP - General (Family Medicine)  Iveth Johnson DO as PCP - [employer] PCP   Dr. Cervantes/Prem-ortho  Dr. Hutton-GI  Dr. Maloney-hepatology  Dr Hemphill-surgery    Tobacco: continues to smoke since age 16, 1/2 ppd now. Did not tolerate chantix due to vivid dreams. Would like to try wellbutrin  DM/lipids: due for recheck. Has been taking statin and tolerating.   LFTs/hep C: followed by hepatology. Has upcoming apt.   Mood/insomnia: improved on SSRI. No longer w/ diarrhea. Sleeping well w/ trazodone  Neuropathy: stable but does not feel kentrell helping. Wants to wean off med  SLE: stable. No longer seeing rheum  Breast CA: stable. Monitored by surgery.   BM: Nml    Chronic pain: rt high, left axillary area. Left lower leg    Review of Systems   Constitutional:  Negative for fatigue.   HENT: Negative.     Eyes:  Negative for visual disturbance.   Respiratory:  Negative for shortness of breath.    Cardiovascular:  Negative for chest pain and palpitations.   Gastrointestinal:  Negative for abdominal distention, abdominal pain, blood in stool, constipation, diarrhea, nausea and vomiting.   Endocrine: Negative for cold intolerance, heat intolerance, polydipsia, polyphagia and polyuria.   Genitourinary:  Negative for difficulty urinating and dysuria.   Musculoskeletal:  Negative for myalgias.   Skin:  Negative for rash.   Allergic/Immunologic: Positive for environmental allergies (occ spring).   Neurological:  Negative for dizziness and headaches.   Psychiatric/Behavioral:  Negative  "for dysphoric mood and sleep disturbance.        Objective   Vitals:  /74   Pulse 72   Temp 36.2 °C (97.2 °F)   Ht 1.651 m (5' 5\")   Wt 68.5 kg (151 lb)   BMI 25.13 kg/m²       Physical Exam  Vitals and nursing note reviewed.   Constitutional:       General: She is not in acute distress.     Appearance: Normal appearance. She is not toxic-appearing.   HENT:      Head: Normocephalic.      Right Ear: Tympanic membrane normal.      Left Ear: Tympanic membrane normal.      Nose: Nose normal.      Mouth/Throat:      Pharynx: Oropharynx is clear.   Eyes:      General: No scleral icterus.     Pupils: Pupils are equal, round, and reactive to light.   Neck:      Vascular: No carotid bruit.   Cardiovascular:      Rate and Rhythm: Normal rate and regular rhythm.      Heart sounds: No murmur heard.  Pulmonary:      Effort: Pulmonary effort is normal. No respiratory distress.      Breath sounds: Normal breath sounds.   Abdominal:      Palpations: Abdomen is soft.      Tenderness: There is no abdominal tenderness. There is no guarding.   Musculoskeletal:         General: No tenderness.      Cervical back: Neck supple.      Right lower leg: No edema.      Left lower leg: No edema.   Lymphadenopathy:      Cervical: No cervical adenopathy.   Skin:     General: Skin is warm.   Neurological:      General: No focal deficit present.      Mental Status: She is alert.      Cranial Nerves: No cranial nerve deficit.   Psychiatric:         Mood and Affect: Mood normal.         Assessment & Plan  Mild episode of recurrent major depressive disorder (CMS-[facility])    Orders:    citalopram (CeleXA) 20 mg tablet; Take 1 tablet (20 mg) by mouth once daily.    Anxiety    Orders:    citalopram (CeleXA) 20 mg tablet; Take 1 tablet (20 mg) by mouth once daily.    Neuropathy    Orders:    gabapentin (Neurontin) 300 mg capsule; Take 1 capsule (300 mg) by mouth 2 times a day.    Routine general medical examination at health care facility     "     Chronic hepatitis C without hepatic coma (Multi)    Orders:    Comprehensive Metabolic Panel; Future    Type 2 diabetes mellitus with other specified complication, without long-term current use of insulin    Orders:    Comprehensive Metabolic Panel; Future    Hemoglobin A1C; Future    POCT Albumin random urine manually resulted    Malignant neoplasm of overlapping sites of left female breast, unspecified estrogen receptor status         Other systemic lupus erythematosus with other organ involvement         Mixed hyperlipidemia    Orders:    Lipid Panel; Future    Insomnia, unspecified type    Orders:    traZODone (Desyrel) 100 mg tablet; Take 1 tablet (100 mg) by mouth once daily at bedtime.    Tobacco use    Orders:    buPROPion SR (Wellbutrin SR) 150 mg 12 hr tablet; Take 1 tablet (150 mg) by mouth 2 times a day. Do not crush, chew, or split.    Tobacco abuse counseling              Discussed prior blood work and wellness issues. Reviewed screenings and immunizations. Recommendations given. Declines all vaccines. Discussed risks    ASCVD risk counseling:  discussed ASCVD risk  Aspirin not indicated at this time. Lifestyle modifications including nutritional choices, exercise and trying to eliminate habits contributing to risk were discussed. Patient agreeable to make efforts to continue to control their current cardiovascular risk factors. On statin.   Tobacco Counseling  3 - 5 minutes were spent counseling the patient on tobacco cessation.  Benefits of cessation were discussed as well as techniques to help quit. Discussed side effects of wellbutrin    ACP: pt states that she is DNR status and will be filling out paperwork soon

## 2025-02-05 NOTE — PATIENT INSTRUCTIONS
Recommend a predominant low fat whole foods plant based diet.  Cut back on meat, dairy, processed carbs, salt and oils(especially palm and coconut). Increase fiber in your diet.  Decrease alcohol as much as possible if you drink. Recommend regular exercise most days of the week(goal up to 150min per week). Also recommend good sleep habits aiming for 7-8 hours per night.     Please bring in copies of your advance directives to your next appointment    Follow up with your specialists as scheduled    Obtain your blood work    Continue your current meds but add wellbutrin to help you quit smoking    Wean off gabapentin as discussed    Return in 3 months, sooner if needed

## 2025-02-10 ENCOUNTER — APPOINTMENT (OUTPATIENT)
Dept: RADIOLOGY | Facility: HOSPITAL | Age: 70
End: 2025-02-10
Payer: MEDICARE

## 2025-02-13 ENCOUNTER — TELEPHONE (OUTPATIENT)
Dept: PRIMARY CARE | Facility: CLINIC | Age: 70
End: 2025-02-13
Payer: MEDICARE

## 2025-02-13 DIAGNOSIS — Z78.0 POSTMENOPAUSAL STATUS (AGE-RELATED) (NATURAL): Primary | ICD-10-CM

## 2025-02-13 NOTE — TELEPHONE ENCOUNTER
Magalis from [employer] called stating pt is due for Dexa Scan. Insurance will cover this and would like to try to get this set up for pt. Ok for Dexa order?  Pt due for Dexa?  Please advise. Thanks. JW

## 2025-02-17 ENCOUNTER — HOSPITAL ENCOUNTER (OUTPATIENT)
Dept: RADIOLOGY | Facility: HOSPITAL | Age: 70
Discharge: HOME | End: 2025-02-17
Payer: MEDICARE

## 2025-02-17 VITALS — WEIGHT: 151 LBS | HEIGHT: 65 IN | BODY MASS INDEX: 25.16 KG/M2

## 2025-02-17 DIAGNOSIS — Z12.31 ENCOUNTER FOR SCREENING MAMMOGRAM FOR BREAST CANCER: ICD-10-CM

## 2025-02-17 PROCEDURE — 77067 SCR MAMMO BI INCL CAD: CPT | Mod: 52,RT

## 2025-02-17 PROCEDURE — 77063 BREAST TOMOSYNTHESIS BI: CPT | Mod: RIGHT SIDE | Performed by: RADIOLOGY

## 2025-02-17 PROCEDURE — 77067 SCR MAMMO BI INCL CAD: CPT | Mod: RIGHT SIDE | Performed by: RADIOLOGY

## 2025-02-19 ENCOUNTER — TELEPHONE (OUTPATIENT)
Dept: SURGERY | Facility: CLINIC | Age: 70
End: 2025-02-19

## 2025-02-19 ENCOUNTER — APPOINTMENT (OUTPATIENT)
Dept: SURGERY | Facility: CLINIC | Age: 70
End: 2025-02-19
Payer: MEDICARE

## 2025-02-19 ENCOUNTER — TELEPHONE (OUTPATIENT)
Dept: PRIMARY CARE | Facility: CLINIC | Age: 70
End: 2025-02-19

## 2025-02-19 NOTE — TELEPHONE ENCOUNTER
Patient saw on her mammogram from 02/17/2025 that there is an ultrasound recommended.  She would like to get it done before her appointment on 03/05/2025.

## 2025-02-19 NOTE — TELEPHONE ENCOUNTER
Pt called Rx line asking for a refill on trazodone, med was sent on 2/5 for 90 day. Called pt and informed. Thanks, AM

## 2025-02-20 DIAGNOSIS — N63.15 MASS OVERLAPPING MULTIPLE QUADRANTS OF RIGHT BREAST: Primary | ICD-10-CM

## 2025-02-20 DIAGNOSIS — S82.62XA CLOSED LOW LATERAL MALLEOLUS FRACTURE, LEFT, INITIAL ENCOUNTER: ICD-10-CM

## 2025-02-24 ENCOUNTER — TELEPHONE (OUTPATIENT)
Dept: PRIMARY CARE | Facility: CLINIC | Age: 70
End: 2025-02-24
Payer: MEDICARE

## 2025-02-24 NOTE — TELEPHONE ENCOUNTER
Malaika from [facility] called requesting Dexa Scan order for pt. Due to Osteoporosis and recent fracture.     Looking into this more. Looks like this has already been handled. Bone Scan order already placed. Closing message. Thanks. SAHIL

## 2025-02-25 ENCOUNTER — HOSPITAL ENCOUNTER (OUTPATIENT)
Dept: RADIOLOGY | Facility: HOSPITAL | Age: 70
Discharge: HOME | End: 2025-02-25
Payer: MEDICARE

## 2025-02-25 DIAGNOSIS — N63.10 BREAST MASS, RIGHT: ICD-10-CM

## 2025-02-25 DIAGNOSIS — N63.15 MASS OVERLAPPING MULTIPLE QUADRANTS OF RIGHT BREAST: ICD-10-CM

## 2025-02-25 DIAGNOSIS — R92.8 ABNORMAL FINDINGS ON DIAGNOSTIC IMAGING OF BREAST: Primary | ICD-10-CM

## 2025-02-25 PROCEDURE — 76642 ULTRASOUND BREAST LIMITED: CPT | Mod: RT

## 2025-02-25 PROCEDURE — 76642 ULTRASOUND BREAST LIMITED: CPT | Mod: RIGHT SIDE | Performed by: RADIOLOGY

## 2025-02-25 PROCEDURE — 76982 USE 1ST TARGET LESION: CPT | Mod: RT

## 2025-02-25 NOTE — PROGRESS NOTES
Patient follow up scheduling:  right breast ultrasound biopsy per provider recommendation, 3/5 1015AM.

## 2025-02-25 NOTE — NURSING NOTE
After patient reviewed diagnostic results with Dr. Cody, referred to this RN Breast Care Coordinator for follow up scheduling and education review. Patient follows with Dr. Hemphill for her breast care and reports having previous breast biopsies. Reviewed literature regarding abnormal breast imaging and breast biopsy including what to expect before, during, and after the procedure reviewed with the patient. Patient denies questions or concerns. Updated provider visit with Dr. Hemphill to 3/4 10:00AM with biopsy to follow 3/5 10:15AM. Appointment reminder sheet provided and reviewed to include directions and how to reach me directly with questions or concerns before concluding visit.

## 2025-02-26 LAB
ALBUMIN SERPL-MCNC: 4.7 G/DL (ref 3.6–5.1)
ALP SERPL-CCNC: 82 U/L (ref 37–153)
ALT SERPL-CCNC: 55 U/L (ref 6–29)
ANION GAP SERPL CALCULATED.4IONS-SCNC: 9 MMOL/L (CALC) (ref 7–17)
AST SERPL-CCNC: 47 U/L (ref 10–35)
BILIRUB SERPL-MCNC: 1.1 MG/DL (ref 0.2–1.2)
BUN SERPL-MCNC: 10 MG/DL (ref 7–25)
CALCIUM SERPL-MCNC: 9.4 MG/DL (ref 8.6–10.4)
CHLORIDE SERPL-SCNC: 105 MMOL/L (ref 98–110)
CHOLEST SERPL-MCNC: 163 MG/DL
CHOLEST/HDLC SERPL: 2.6 (CALC)
CO2 SERPL-SCNC: 30 MMOL/L (ref 20–32)
CREAT SERPL-MCNC: 0.75 MG/DL (ref 0.6–1)
EGFRCR SERPLBLD CKD-EPI 2021: 86 ML/MIN/1.73M2
EST. AVERAGE GLUCOSE BLD GHB EST-MCNC: 120 MG/DL
EST. AVERAGE GLUCOSE BLD GHB EST-SCNC: 6.6 MMOL/L
GLUCOSE SERPL-MCNC: 101 MG/DL (ref 65–99)
HBA1C MFR BLD: 5.8 % OF TOTAL HGB
HDLC SERPL-MCNC: 63 MG/DL
LDLC SERPL CALC-MCNC: 76 MG/DL (CALC)
NONHDLC SERPL-MCNC: 100 MG/DL (CALC)
POTASSIUM SERPL-SCNC: 3.5 MMOL/L (ref 3.5–5.3)
PROT SERPL-MCNC: 6.5 G/DL (ref 6.1–8.1)
SODIUM SERPL-SCNC: 144 MMOL/L (ref 135–146)
TRIGL SERPL-MCNC: 147 MG/DL

## 2025-02-27 ENCOUNTER — APPOINTMENT (OUTPATIENT)
Dept: ORTHOPEDIC SURGERY | Facility: CLINIC | Age: 70
End: 2025-02-27
Payer: MEDICARE

## 2025-02-27 ENCOUNTER — HOSPITAL ENCOUNTER (OUTPATIENT)
Dept: RADIOLOGY | Facility: CLINIC | Age: 70
Discharge: HOME | End: 2025-02-27
Payer: MEDICARE

## 2025-02-27 VITALS — BODY MASS INDEX: 25.61 KG/M2 | WEIGHT: 150 LBS | HEIGHT: 64 IN

## 2025-02-27 DIAGNOSIS — S82.62XA CLOSED LOW LATERAL MALLEOLUS FRACTURE, LEFT, INITIAL ENCOUNTER: Primary | ICD-10-CM

## 2025-02-27 DIAGNOSIS — S82.62XA CLOSED LOW LATERAL MALLEOLUS FRACTURE, LEFT, INITIAL ENCOUNTER: ICD-10-CM

## 2025-02-27 PROCEDURE — 1159F MED LIST DOCD IN RCRD: CPT | Performed by: SPECIALIST

## 2025-02-27 PROCEDURE — 99213 OFFICE O/P EST LOW 20 MIN: CPT | Performed by: SPECIALIST

## 2025-02-27 PROCEDURE — 3008F BODY MASS INDEX DOCD: CPT | Performed by: SPECIALIST

## 2025-02-27 PROCEDURE — 73610 X-RAY EXAM OF ANKLE: CPT | Mod: LT

## 2025-02-27 PROCEDURE — 1123F ACP DISCUSS/DSCN MKR DOCD: CPT | Performed by: SPECIALIST

## 2025-02-28 NOTE — PROGRESS NOTES
Pt is a 69 Y old Female presenting today for a follow up of the Left Fibula Fracture DOI 1/10/25  XR were taken today 02/27/2025 . Was seen at [facility] placed in boot. Pt has had the boot off now for about 1 week. Notes some tenderness around the ankle. Overall doing well.    Exam: Left ankle with mild swelling no ecchymosis no erythema.  Mild discomfort to palpation over the lateral malleolus but stable alignment and range of motion of the joint.  No stress test instability or severe pain.  Distal neurovascular intact negative Homans.    Radiographs: 3 view standing left ankle shows a stable ankle mortise and healing minimally displaced lateral malleolus Land B fracture.    Assessment plan: Healing stable lateral malleolus fracture.  I believe the patient was a little premature to fully come out of the boot.  I recommend she wean from the boot over the next 2 weeks as pain allows.  We should do a final 3 view standing x-ray in a month to confirm complete healing.

## 2025-03-04 ENCOUNTER — APPOINTMENT (OUTPATIENT)
Facility: CLINIC | Age: 70
End: 2025-03-04
Payer: MEDICARE

## 2025-03-04 VITALS
OXYGEN SATURATION: 96 % | WEIGHT: 152.2 LBS | BODY MASS INDEX: 25.99 KG/M2 | HEIGHT: 64 IN | DIASTOLIC BLOOD PRESSURE: 76 MMHG | SYSTOLIC BLOOD PRESSURE: 118 MMHG | HEART RATE: 75 BPM

## 2025-03-04 DIAGNOSIS — N63.15 MASS OVERLAPPING MULTIPLE QUADRANTS OF RIGHT BREAST: Primary | ICD-10-CM

## 2025-03-04 DIAGNOSIS — C50.812 MALIGNANT NEOPLASM OF OVERLAPPING SITES OF LEFT BREAST IN FEMALE, ESTROGEN RECEPTOR NEGATIVE: ICD-10-CM

## 2025-03-04 DIAGNOSIS — Z17.1 MALIGNANT NEOPLASM OF OVERLAPPING SITES OF LEFT BREAST IN FEMALE, ESTROGEN RECEPTOR NEGATIVE: ICD-10-CM

## 2025-03-04 PROCEDURE — 1123F ACP DISCUSS/DSCN MKR DOCD: CPT | Performed by: SURGERY

## 2025-03-04 PROCEDURE — 99214 OFFICE O/P EST MOD 30 MIN: CPT | Performed by: SURGERY

## 2025-03-04 PROCEDURE — G2211 COMPLEX E/M VISIT ADD ON: HCPCS | Performed by: SURGERY

## 2025-03-04 PROCEDURE — 3078F DIAST BP <80 MM HG: CPT | Performed by: SURGERY

## 2025-03-04 PROCEDURE — 1160F RVW MEDS BY RX/DR IN RCRD: CPT | Performed by: SURGERY

## 2025-03-04 PROCEDURE — 3074F SYST BP LT 130 MM HG: CPT | Performed by: SURGERY

## 2025-03-04 PROCEDURE — 1159F MED LIST DOCD IN RCRD: CPT | Performed by: SURGERY

## 2025-03-04 PROCEDURE — 3008F BODY MASS INDEX DOCD: CPT | Performed by: SURGERY

## 2025-03-04 ASSESSMENT — ENCOUNTER SYMPTOMS
ABDOMINAL PAIN: 0
SHORTNESS OF BREATH: 0
FEVER: 0
SPEECH DIFFICULTY: 0
WOUND: 0
DIARRHEA: 0
COUGH: 0
CONFUSION: 0
FLANK PAIN: 0
POLYPHAGIA: 0
CHILLS: 0
MYALGIAS: 0
VOMITING: 0
WEAKNESS: 0
FATIGUE: 0
BRUISES/BLEEDS EASILY: 0
EYE PAIN: 0
CONSTIPATION: 0
EYE REDNESS: 0
ARTHRALGIAS: 0
FREQUENCY: 0
NAUSEA: 0
DYSURIA: 0
HEADACHES: 0
AGITATION: 0
HEMATURIA: 0

## 2025-03-04 NOTE — PATIENT INSTRUCTIONS
1.  Keep your appointment for your ultrasound-guided biopsy of your right breast mass on Wednesday, 3/5/2025.  This biopsy is performed in the radiology department of [facility].  2.  If you have any problems with the biopsy site (bleeding or concern for infection), please contact Dr. Hemphill's office.  [phone]  3.  Follow-up with Dr. Hemphill's office on Wednesday, 3/12/2025 to review your biopsy results.  4.  Every month, do your self breast exam, as well as, examine your left chest wall.  If you identify any abnormalities, please contact Dr. Hemphill's office immediately.  [phone]  5.  You will be due for a right sided diagnostic mammogram in 6 months for follow-up of the right breast mass.  Dr. Hemphill's office after this mammogram.

## 2025-03-04 NOTE — PROGRESS NOTES
GENERAL SURGERY OFFICE NOTE    Patient: Navya Clements    Age: 69 y.o.   Gender: female    MRN: [id number]    PCP: Iveth Johnson, DO        SUBJECTIVE     Chief Complaint  Follow-up (Patient is here for a 1 year left breast follow up. Patient states that she is not having any problems with the left breast. Patient is scheduled for a right breast biopsy tomorrow 3/5/25. Patient denies any redness, swelling, lumps, pain, tenderness, nipple discharge, and trauma.)       SILVANO Mendosa returns to the office for her yearly examination and follow-up of her left breast cancer.  She had received treatment for triple negative left breast cancer in [place].  She had undergone a left mastectomy with sentinel lymph node biopsy (7 lymph nodes removed) followed by adjuvant chemotherapy and XRT.  She gets her blood drawn and sent back to her oncologist in [place] every other month.  She reports undergoing genetic testing and was negative for the BRCA gene.  Since her last office visit, her Port-A-Cath has been removed.  She recently underwent her yearly right breast screening mammogram and a 6.1 mm lobulated mass was identified.  She had a follow-up ultrasound which suggested a 3.4 mm mass at the 9 o'clock position, 2 cm from the nipple for which an ultrasound-guided biopsy was recommended.  She has not noticed any new masses or abnormalities.  No skin changes.  No nipple drainage from the right breast.  She continues to have an intermittent burning sensation along the left chest wall which has been present ever since her mastectomy.  She occasionally uses a lidocaine patch and ice to help with the pain.  In the last few months, she has been seen by GI for her diarrhea.  She notes a change of her anxiety medication which has helped with the diarrhea issues.  She is no longer getting her blood work through the physicians in [place].  She is getting her yearly laboratory work done through her primary care  physician.    VLAD  Review of Systems   Constitutional:  Negative for chills, fatigue and fever.   HENT:  Negative for congestion, ear pain and hearing loss.    Eyes:  Negative for pain and redness.   Respiratory:  Negative for cough and shortness of breath.    Cardiovascular:  Negative for chest pain and leg swelling.   Gastrointestinal:  Negative for abdominal pain, constipation, diarrhea, nausea and vomiting.   Endocrine: Negative for polyphagia.   Genitourinary:  Negative for dysuria, flank pain, frequency and hematuria.   Musculoskeletal:  Negative for arthralgias and myalgias.   Skin:  Negative for rash and wound.   Allergic/Immunologic: Negative for immunocompromised state.   Neurological:  Negative for speech difficulty, weakness and headaches.   Hematological:  Does not bruise/bleed easily.   Psychiatric/Behavioral:  Negative for agitation and confusion.           HISTORY     Past Medical History:   Diagnosis Date    Anxiety     Arthritis     Breast cancer (Multi) 04/21    Depression     Hx antineoplastic chemo 0422    Hyperlipidemia     Personal history of irradiation 2023        Past Surgical History:   Procedure Laterality Date    BREAST BIOPSY Right 2022    MASTECTOMY Left 2022        No Known Allergies     Social History     Tobacco Use   Smoking Status Every Day    Current packs/day: 0.50    Types: Cigarettes   Smokeless Tobacco Never        Social History     Substance and Sexual Activity   Alcohol Use Yes    Alcohol/week: 2.0 standard drinks of alcohol    Types: 2 Standard drinks or equivalent per week    Comment: occasionally        HOME MEDICATIONS  Current Outpatient Medications   Medication Instructions    buPROPion SR (WELLBUTRIN SR) 150 mg, oral, 2 times daily, Do not crush, chew, or split.    citalopram (CELEXA) 20 mg, oral, Daily    fluticasone (Flonase) 50 mcg/actuation nasal spray 1 spray, Each Nostril, Daily, Shake gently. Before first use, prime pump. After use, clean tip and replace cap.  "   rosuvastatin (CRESTOR) 40 mg, oral, Daily    traZODone (DESYREL) 100 mg, oral, Nightly          OBJECTIVE   Last Recorded Vitals.  Blood pressure 118/76, pulse 75, height 1.626 m (5' 4\"), weight 69 kg (152 lb 3.2 oz), SpO2 96%.     PHYSICAL EXAM  Physical Exam   General: Well-developed, well-nourished and in no acute distress.  Head: Normocephalic. Atraumatic.  Neck/thyroid: Neck is supple.   Eyes: Pupils equal round and reactive to light. Conjunctiva normal.  ENMT: No masses or deformity of external nose. External ears without masses.  Respiratory/Chest:  Normal respiratory effort.  Port-A-Cath in the subclavian position with visible catheter under the skin up to the right IJ.  Breast: s/p left mastectomy.  No palpable masses or skin lesions.  Right breast is moderate sized.  No predominant masses.  No skin changes or nipple discharge.  Lymphatics: No palpable lymphadenopathy of the cervical, supraclavicular or axillary regions.  Cardiovascular: Regular rate and rhythm.   Abdomen: Soft, nontender, nondistended.   Musculoskeletal: Joints and limbs are grossly normal. Normal gait. Normal range of motion of major joints.  Neuro: Oriented to person, place and time. No obvious neurological deficit. Motor strength grossly normal.  Psych: Normal mood and affect.    RESULTS   Labs  No results found for this or any previous visit (from the past 24 hours).    Radiology Resutls  mammo right screening tomosynthesis  Status: Final result     PACS Images     Show images for BI mammo right screening tomosynthesis  Signed by    Signed Time Phone Pager   Hari Cody MD 2/18/2025 14:03 [phone]     Exam Information    Status Exam Begun Exam Ended   Final 2/17/2025 13:39 2/17/2025 13:56     Study Result    Narrative & Impression   Interpreted By:  Hari Cody,   STUDY:  BI MAMMO RIGHT SCREENING TOMOSYNTHESIS;  2/17/2025 1:56 pm      ACCESSION NUMBER(S):  [id number]      ORDERING CLINICIAN:  OLEG" MIGUEL      INDICATION:  Screening.      ,Z12.31 Encounter for screening mammogram for malignant neoplasm of  breast      COMPARISON:  08/15/2023 and 02/16/2024      FINDINGS:  2D and tomosynthesis images were reviewed at 1 mm slice thickness.      Density:  The breasts are heterogeneously dense, which may obscure  small masses.      There is a lobulated mass identified of the right breast anterior  depth measuring 6.1 mm in size. This is probably benign unchanged  from prior examination, nonetheless ultrasound evaluation recommended.      IMPRESSION:  Mass asymmetry right breast. Ultrasound follow-up recommended.      BI-RADS CATEGORY:  BI-RADS Category:  0 Incomplete; Need Additional Imaging Evaluation  Recommendation:  Additional Imaging.  Recommended Date:  Immediate.  Laterality:  Right.              For any future breast imaging appointments, please call [phone]  ([phone]).          MACRO:  None      Signed by: Hari Cody 2/18/2025 2:03 PM  Dictation workstation:   [id number]     US breast limited right  Status: Final result     PACS Images     Show images for BI US breast limited right  Signed by    Signed Time Phone Pager   Hari Cody MD 2/25/2025 17:24 [phone]     Exam Information    Status Exam Begun Exam Ended   Final 2/25/2025 12:54 2/25/2025 13:13     Study Result    Narrative & Impression   Interpreted By:  Hari Cody,   STUDY:  BI US BREAST LIMITED RIGHT;  2/25/2025 1:13 pm      ACCESSION NUMBER(S):  [id number]      ORDERING CLINICIAN:  OLEG RIVERA      INDICATION:  Asymmetry right breast      ,N63.15 Unspecified lump in the right breast, overlapping quadrants      COMPARISON:  Mammogram 02/17/2025      FINDINGS:  Targeted ultrasound was performed of the right breast by a registered  sonographer with elastography.      9 o'clock position 2 cm from nipple demonstrates a vertically  oriented solid mass measuring 3.4 mm in size. Elastography  indeterminate.       Right axilla demonstrates a normal lymph node.      IMPRESSION:  Very small albeit indeterminate mass right breast.      BI-RADS CATEGORY:  BI-RADS Category:  4 Suspicious.  Recommendation:  Surgical Consultation and Biopsy.  Recommended Date:  Immediate.  Laterality:  Right.      Ultrasound-guided biopsy right breast recommended. Surgical  consultation with history and physical required prior to biopsy.      For any future breast imaging appointments, please call [phone]).          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  1 of 8 lymph nodes have micro met (ypTis N0i+ M0); adj XRT; pembrolizumab x9 dosages    March 2025: RIGHT; 6.1 mm lobulated mass at the 9 position, 2 cm from the nipple    1.  By physical exam, she has no evidence of recurrent cancer on the left.  2.  She is no longer getting her yearly blood work through her [place] oncologist, but is getting yearly blood work through her PCP.  3.  No palpable abnormalities on the right.  However, recent screening mammogram suggested a 6 mm mass ultrasound-guided biopsy is scheduled for tomorrow, 3/5/2025.  The process of the biopsy was reviewed with patient.  She will follow-up with a virtual appointment 3/12/2025 to review biopsy results.  4.  She likely will require radiographic evaluation of the right breast every 6 months for 2 years to demonstrate stability of the right breast mass if biopsy is benign.  Will schedule  for right diagnostic mammogram in 6 months with follow-up in the office after the mammogram.  5.  She is encouraged to do her monthly self breast exams, as well as, examining her left chest wall monthly.  She should call the office if she identifies any abnormalities.      Leonie Hemphill MD, FACS  [facility] General Surgery  [address] [place] [address];   [address]  [place] [id number] [phone]

## 2025-03-05 ENCOUNTER — HOSPITAL ENCOUNTER (OUTPATIENT)
Dept: RADIOLOGY | Facility: HOSPITAL | Age: 70
Discharge: HOME | End: 2025-03-05
Payer: MEDICARE

## 2025-03-05 ENCOUNTER — APPOINTMENT (OUTPATIENT)
Dept: SURGERY | Facility: CLINIC | Age: 70
End: 2025-03-05
Payer: MEDICARE

## 2025-03-05 VITALS
HEART RATE: 73 BPM | RESPIRATION RATE: 18 BRPM | SYSTOLIC BLOOD PRESSURE: 136 MMHG | OXYGEN SATURATION: 95 % | DIASTOLIC BLOOD PRESSURE: 86 MMHG

## 2025-03-05 DIAGNOSIS — N63.10 BREAST MASS, RIGHT: ICD-10-CM

## 2025-03-05 DIAGNOSIS — R92.8 ABNORMAL FINDINGS ON DIAGNOSTIC IMAGING OF BREAST: ICD-10-CM

## 2025-03-05 PROCEDURE — 2720000007 HC OR 272 NO HCPCS

## 2025-03-05 PROCEDURE — 77065 DX MAMMO INCL CAD UNI: CPT | Performed by: RADIOLOGY

## 2025-03-05 PROCEDURE — 19083 BX BREAST 1ST LESION US IMAG: CPT | Performed by: RADIOLOGY

## 2025-03-05 PROCEDURE — A4648 IMPLANTABLE TISSUE MARKER: HCPCS

## 2025-03-05 PROCEDURE — 19083 BX BREAST 1ST LESION US IMAG: CPT | Mod: RT

## 2025-03-05 ASSESSMENT — PAIN - FUNCTIONAL ASSESSMENT: PAIN_FUNCTIONAL_ASSESSMENT: 0-10

## 2025-03-05 ASSESSMENT — PAIN SCALES - GENERAL: PAINLEVEL_OUTOF10: 0 - NO PAIN

## 2025-03-05 NOTE — NURSING NOTE
Patient demonstrated mild anxiousness prior to biopsy. Interventions including therapeutic nursing support, and distraction provided throughout procedure with good response. Patient completed procedure without difficulty. Hemostasis achieved at right breast puncture site with manual pressure. Bandage applied. Patient verbalized understanding discharge instructions and has written copy for review. Assisted by technician for post-procedure clip imaging. Ambulates independently without difficulty to Mammography changing area.

## 2025-03-11 ENCOUNTER — APPOINTMENT (OUTPATIENT)
Dept: GASTROENTEROLOGY | Facility: CLINIC | Age: 70
End: 2025-03-11
Payer: MEDICARE

## 2025-03-11 LAB
LAB AP ASR DISCLAIMER: NORMAL
LABORATORY COMMENT REPORT: NORMAL
PATH REPORT.FINAL DX SPEC: NORMAL
PATH REPORT.GROSS SPEC: NORMAL
PATH REPORT.RELEVANT HX SPEC: NORMAL
PATH REPORT.TOTAL CANCER: NORMAL
RESIDENT REVIEW: NORMAL

## 2025-03-12 ENCOUNTER — APPOINTMENT (OUTPATIENT)
Dept: SURGERY | Facility: CLINIC | Age: 70
End: 2025-03-12
Payer: MEDICARE

## 2025-03-12 VITALS — BODY MASS INDEX: 25.99 KG/M2 | HEIGHT: 64 IN | WEIGHT: 152.2 LBS

## 2025-03-12 DIAGNOSIS — C50.812 MALIGNANT NEOPLASM OF OVERLAPPING SITES OF LEFT BREAST IN FEMALE, ESTROGEN RECEPTOR NEGATIVE: ICD-10-CM

## 2025-03-12 DIAGNOSIS — Z17.1 MALIGNANT NEOPLASM OF OVERLAPPING SITES OF LEFT BREAST IN FEMALE, ESTROGEN RECEPTOR NEGATIVE: ICD-10-CM

## 2025-03-12 DIAGNOSIS — N63.15 MASS OVERLAPPING MULTIPLE QUADRANTS OF RIGHT BREAST: Primary | ICD-10-CM

## 2025-03-12 DIAGNOSIS — Z13.71 BRCA NEGATIVE: ICD-10-CM

## 2025-03-12 ASSESSMENT — ENCOUNTER SYMPTOMS
EYE REDNESS: 0
FREQUENCY: 0
VOMITING: 0
CONSTIPATION: 0
ARTHRALGIAS: 0
FLANK PAIN: 0
COUGH: 0
ABDOMINAL PAIN: 0
FEVER: 0
SHORTNESS OF BREATH: 0
MYALGIAS: 0
CONFUSION: 0
HEADACHES: 0
BRUISES/BLEEDS EASILY: 0
FATIGUE: 0
WEAKNESS: 0
HEMATURIA: 0
NAUSEA: 0
WOUND: 0
POLYPHAGIA: 0
SPEECH DIFFICULTY: 0
CHILLS: 0
DYSURIA: 0
AGITATION: 0
EYE PAIN: 0
DIARRHEA: 0

## 2025-03-12 NOTE — PROGRESS NOTES
GENERAL SURGERY OFFICE NOTE    Patient: Navya Clements    Age: 69 y.o.   Gender: female    MRN: [id number]    PCP: Iveth Johnson, DO        SUBJECTIVE     Chief Complaint  Follow-up (Patient is following up after a right breast biopsy. Patient states that she had a little bit of tenderness after the biopsy. Patient states that she does not have any bruising to the breast.)       HPI  The patient was interviewed via a phone. We spent approximately 10 minutes in the phone communication. The patient gave consent for this type of visit.  I performed this visit using real-time telehealth tools, including a telephone connection between Navya at her home and myself / Dr. Hemphill at the [facility] office.  An attempt was made to do a virtual appointment, but due to technical difficulty, this was switched to a phone visit.    Navya returns to the office for follow-up after undergoing a core needle biopsy of a right breast mass.  She states at the time of the biopsy, she had no problems.  She did not even have a bruise after the biopsy.  No bleeding or drainage.  No discomfort.  She is here to review results.    ROS  Review of Systems   Constitutional:  Negative for chills, fatigue and fever.   HENT:  Negative for congestion, ear pain and hearing loss.    Eyes:  Negative for pain and redness.   Respiratory:  Negative for cough and shortness of breath.    Cardiovascular:  Negative for chest pain and leg swelling.   Gastrointestinal:  Negative for abdominal pain, constipation, diarrhea, nausea and vomiting.   Endocrine: Negative for polyphagia.   Genitourinary:  Negative for dysuria, flank pain, frequency and hematuria.   Musculoskeletal:  Negative for arthralgias and myalgias.   Skin:  Negative for rash and wound.   Allergic/Immunologic: Negative for immunocompromised state.   Neurological:  Negative for speech difficulty, weakness and headaches.   Hematological:  Does not bruise/bleed easily.  "  Psychiatric/Behavioral:  Negative for agitation and confusion.           HISTORY     Past Medical History:   Diagnosis Date    Anxiety     Arthritis     Breast cancer (Multi) 04/21    Depression     Hx antineoplastic chemo     Hyperlipidemia     Personal history of irradiation 2023        Past Surgical History:   Procedure Laterality Date    BREAST BIOPSY Right     MASTECTOMY Left         No Known Allergies     Social History     Tobacco Use   Smoking Status Every Day    Current packs/day: 0.50    Average packs/day: 0.5 packs/day for 15.0 years (7.5 ttl pk-yrs)    Types: Cigarettes   Smokeless Tobacco Never        Social History     Substance and Sexual Activity   Alcohol Use Yes    Alcohol/week: 2.0 standard drinks of alcohol    Types: 2 Standard drinks or equivalent per week    Comment: occasionally        HOME MEDICATIONS  Current Outpatient Medications   Medication Instructions    buPROPion SR (WELLBUTRIN SR) 150 mg, oral, 2 times daily, Do not crush, chew, or split.    citalopram (CELEXA) 20 mg, oral, Daily    fluticasone (Flonase) 50 mcg/actuation nasal spray 1 spray, Each Nostril, Daily, Shake gently. Before first use, prime pump. After use, clean tip and replace cap.    rosuvastatin (CRESTOR) 40 mg, oral, Daily    traZODone (DESYREL) 100 mg, oral, Nightly          OBJECTIVE   Last Recorded Vitals.  Height 1.626 m (5' 4\"), weight 69 kg (152 lb 3.2 oz).     PHYSICAL EXAM  Physical Exam   Phone visit/previous exam:  General: Well-developed, well-nourished and in no acute distress.  Head: Normocephalic. Atraumatic.  Neck/thyroid: Neck is supple.   Eyes: Pupils equal round and reactive to light. Conjunctiva normal.  ENMT: No masses or deformity of external nose. External ears without masses.  Respiratory/Chest:  Normal respiratory effort.  Port-A-Cath in the subclavian position with visible catheter under the skin up to the right IJ.  Breast: s/p left mastectomy.  No palpable masses or skin lesions.  Right " breast is moderate sized.  No predominant masses.  No skin changes or nipple discharge.  Lymphatics: No palpable lymphadenopathy of the cervical, supraclavicular or axillary regions.  Cardiovascular: Regular rate and rhythm.   Abdomen: Soft, nontender, nondistended.   Musculoskeletal: Joints and limbs are grossly normal. Normal gait. Normal range of motion of major joints.  Neuro: Oriented to person, place and time. No obvious neurological deficit. Motor strength grossly normal.  Psych: Normal mood and affect.    RESULTS   Labs  No results found for this or any previous visit (from the past 24 hours).    Radiology Resutls  mammo right screening tomosynthesis  Status: Final result     PACS Images     Show images for BI mammo right screening tomosynthesis  Signed by    Signed Time Phone Pager   Hari Cody MD 2/18/2025 14:03 [phone]     Exam Information    Status Exam Begun Exam Ended   Final 2/17/2025 13:39 2/17/2025 13:56     Study Result    Narrative & Impression   Interpreted By:  Hari Cody,   STUDY:  BI MAMMO RIGHT SCREENING TOMOSYNTHESIS;  2/17/2025 1:56 pm      ACCESSION NUMBER(S):  [id number]      ORDERING CLINICIAN:  OLEG RIVERA      INDICATION:  Screening.      ,Z12.31 Encounter for screening mammogram for malignant neoplasm of  breast      COMPARISON:  08/15/2023 and 02/16/2024      FINDINGS:  2D and tomosynthesis images were reviewed at 1 mm slice thickness.      Density:  The breasts are heterogeneously dense, which may obscure  small masses.      There is a lobulated mass identified of the right breast anterior  depth measuring 6.1 mm in size. This is probably benign unchanged  from prior examination, nonetheless ultrasound evaluation recommended.      IMPRESSION:  Mass asymmetry right breast. Ultrasound follow-up recommended.      BI-RADS CATEGORY:  BI-RADS Category:  0 Incomplete; Need Additional Imaging Evaluation  Recommendation:  Additional Imaging.  Recommended Date:   Immediate.  Laterality:  Right.              For any future breast imaging appointments, please call [phone]  ([phone]).          MACRO:  None      Signed by: Hari Cody 2/18/2025 2:03 PM  Dictation workstation:   [id number]     US breast limited right  Status: Final result     PACS Images     Show images for BI US breast limited right  Signed by    Signed Time Phone Pager   Hari Cody MD 2/25/2025 17:24 [phone]     Exam Information    Status Exam Begun Exam Ended   Final 2/25/2025 12:54 2/25/2025 13:13     Study Result    Narrative & Impression   Interpreted By:  Hari Cody,   STUDY:  BI US BREAST LIMITED RIGHT;  2/25/2025 1:13 pm      ACCESSION NUMBER(S):  [id number]      ORDERING CLINICIAN:  OLEG RIVERA      INDICATION:  Asymmetry right breast      ,N63.15 Unspecified lump in the right breast, overlapping quadrants      COMPARISON:  Mammogram 02/17/2025      FINDINGS:  Targeted ultrasound was performed of the right breast by a registered  sonographer with elastography.      9 o'clock position 2 cm from nipple demonstrates a vertically  oriented solid mass measuring 3.4 mm in size. Elastography  indeterminate.      Right axilla demonstrates a normal lymph node.      IMPRESSION:  Very small albeit indeterminate mass right breast.      BI-RADS CATEGORY:  BI-RADS Category:  4 Suspicious.  Recommendation:  Surgical Consultation and Biopsy.  Recommended Date:  Immediate.  Laterality:  Right.      Ultrasound-guided biopsy right breast recommended. Surgical  consultation with history and physical required prior to biopsy.      For any future breast imaging appointments, please call [phone]  ([phone]).          MACRO:  None      Signed by: Hari Cody 2/25/2025 5:24 PM  Dictation workstation:   [id number]         ASSESSMENT / PLAN   There are no diagnoses linked to this encounter.        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  1 of 8 lymph nodes have micro met (ypTis N0i+ M0); adj XRT; pembrolizumab x9 dosages    March 2025: RIGHT; 6.1 mm lobulated mass at the 9 position, 2 cm from the nipple    1.  By physical exam, she has no evidence of recurrent left breast cancer.  2.  She is no longer getting her yearly blood work through her [place] oncologist, but is getting yearly blood work through her PCP.  3.  No palpable abnormalities on the right.  However, recent screening mammogram suggested a 6 mm mass.  She underwent an ultrasound-guided biopsy of the mass which demonstrated benign sclerosing lesion.  Will plan for short-term follow-up with a diagnostic right mammogram in 6 months.  She will follow-up in the office after this mammogram.  Will plan for radiographic evaluation every 6 months for 2 years to demonstrate stability.  4.  She is encouraged to do her monthly self breast exams, as well as, examining her left chest wall monthly.  She should call the office if she identifies any abnormalities.      Leonie Hemphill MD, FACS  [facility] General Surgery  [address] [place] [address];   [address]  [place]  [address] [phone]

## 2025-03-12 NOTE — PATIENT INSTRUCTIONS
1.  The recent biopsy of the right breast mass did NOT demonstrate any cancer.  This area will be monitored every 6 months for 2 years for any changes.  Therefore, you are scheduled for a right diagnostic mammogram on 9/2/2025.  2.  Follow-up in Dr. Hemphill's office on 9/9/2025 after your diagnostic mammogram.  3.  Monthly, do your self breast exams, as well as, check your left chest wall.  If you identify any abnormalities, contact Dr. Hemphill's office immediately.  [phone]

## 2025-03-18 ENCOUNTER — HOSPITAL ENCOUNTER (OUTPATIENT)
Dept: RADIOLOGY | Facility: CLINIC | Age: 70
Discharge: HOME | End: 2025-03-18
Payer: MEDICARE

## 2025-03-18 DIAGNOSIS — Z78.0 POSTMENOPAUSAL STATUS (AGE-RELATED) (NATURAL): ICD-10-CM

## 2025-03-18 PROCEDURE — 77080 DXA BONE DENSITY AXIAL: CPT

## 2025-03-18 PROCEDURE — 77080 DXA BONE DENSITY AXIAL: CPT | Performed by: RADIOLOGY

## 2025-03-20 ENCOUNTER — TELEPHONE (OUTPATIENT)
Dept: PRIMARY CARE | Facility: CLINIC | Age: 70
End: 2025-03-20
Payer: MEDICARE

## 2025-03-20 NOTE — TELEPHONE ENCOUNTER
----- Message from Iveth Johnson sent at 3/19/2025 11:59 PM EDT -----  Please tell the patient that her DEXA bone scan showed thinning of her  bones but not osteoporosis.  Recommend increasing calcium in her diet, mostly from dark green vegetables.  Try to aim for 1200mg calcium in diet(can supplement the rest if unable to get from diet) with 800 U vit D. Recommend weight bearing exercises.

## 2025-03-21 DIAGNOSIS — S82.62XA CLOSED LOW LATERAL MALLEOLUS FRACTURE, LEFT, INITIAL ENCOUNTER: ICD-10-CM

## 2025-03-27 ENCOUNTER — HOSPITAL ENCOUNTER (OUTPATIENT)
Dept: RADIOLOGY | Facility: CLINIC | Age: 70
Discharge: HOME | End: 2025-03-27
Payer: MEDICARE

## 2025-03-27 ENCOUNTER — APPOINTMENT (OUTPATIENT)
Dept: ORTHOPEDIC SURGERY | Facility: CLINIC | Age: 70
End: 2025-03-27
Payer: MEDICARE

## 2025-03-27 VITALS — WEIGHT: 150 LBS | HEIGHT: 64 IN | BODY MASS INDEX: 25.61 KG/M2

## 2025-03-27 DIAGNOSIS — S82.62XA CLOSED LOW LATERAL MALLEOLUS FRACTURE, LEFT, INITIAL ENCOUNTER: Primary | ICD-10-CM

## 2025-03-27 DIAGNOSIS — S82.62XA CLOSED LOW LATERAL MALLEOLUS FRACTURE, LEFT, INITIAL ENCOUNTER: ICD-10-CM

## 2025-03-27 PROCEDURE — 3008F BODY MASS INDEX DOCD: CPT | Performed by: SPECIALIST

## 2025-03-27 PROCEDURE — 73610 X-RAY EXAM OF ANKLE: CPT | Mod: LT

## 2025-03-27 PROCEDURE — 99214 OFFICE O/P EST MOD 30 MIN: CPT | Performed by: SPECIALIST

## 2025-03-27 PROCEDURE — 1123F ACP DISCUSS/DSCN MKR DOCD: CPT | Performed by: SPECIALIST

## 2025-03-27 PROCEDURE — 1159F MED LIST DOCD IN RCRD: CPT | Performed by: SPECIALIST

## 2025-03-27 ASSESSMENT — ENCOUNTER SYMPTOMS
DEPRESSION: 0
LOSS OF SENSATION IN FEET: 1
OCCASIONAL FEELINGS OF UNSTEADINESS: 0

## 2025-03-27 ASSESSMENT — PAIN - FUNCTIONAL ASSESSMENT: PAIN_FUNCTIONAL_ASSESSMENT: NO/DENIES PAIN

## 2025-03-27 NOTE — PROGRESS NOTES
Follow up for Closed left low lateral malleolus fracture. Patient has no complaints or concerns. Denies numbness and tingling. Patient overall feels well. XR done today.  Date of injury is 1/10/2025.  She comes in today walking in regular shoewear him wean from her boot.    Exam: Left ankle with minimal swelling no erythema no ecchymosis.  She has minimal pain over the fracture site no other regions of pain.  Ankle is stable to passive external rotation anterior drawer.  Motor intact..Passive ankle range of motion is stable in good alignment.  Negative Homans no other acute findings.    Radiographs:Three-view standing left ankle shows a maintained stable ankle mortise with minimally to nondisplaced fracture healing well.  No other acute findings.    Assessment plan: Well-healing left ankle fracture.  She will continue her home range of motion strengthening program did not want to proceed with PT.  Follow-up if any progressive pain or new findings.

## 2025-04-29 DIAGNOSIS — H69.90 DISORDER OF EUSTACHIAN TUBE, UNSPECIFIED LATERALITY: ICD-10-CM

## 2025-04-29 RX ORDER — FLUTICASONE PROPIONATE 50 MCG
1 SPRAY, SUSPENSION (ML) NASAL DAILY
Qty: 16 G | Refills: 0 | Status: SHIPPED | OUTPATIENT
Start: 2025-04-29 | End: 2026-04-29

## 2025-04-29 NOTE — TELEPHONE ENCOUNTER
Pt called rx line at 106p requesting pended med- OUT    Next OV 5/5  Pt compliant  Pt OUT  Pt uses [facility] Thx

## 2025-05-05 ENCOUNTER — APPOINTMENT (OUTPATIENT)
Dept: PRIMARY CARE | Facility: CLINIC | Age: 70
End: 2025-05-05
Payer: MEDICARE

## 2025-05-05 VITALS
BODY MASS INDEX: 25.71 KG/M2 | OXYGEN SATURATION: 94 % | DIASTOLIC BLOOD PRESSURE: 88 MMHG | HEART RATE: 77 BPM | SYSTOLIC BLOOD PRESSURE: 126 MMHG | WEIGHT: 149.8 LBS | TEMPERATURE: 97.8 F

## 2025-05-05 DIAGNOSIS — Z72.0 TOBACCO USE: ICD-10-CM

## 2025-05-05 DIAGNOSIS — E78.2 MIXED HYPERLIPIDEMIA: ICD-10-CM

## 2025-05-05 DIAGNOSIS — G47.00 INSOMNIA, UNSPECIFIED TYPE: ICD-10-CM

## 2025-05-05 DIAGNOSIS — F33.0 MILD EPISODE OF RECURRENT MAJOR DEPRESSIVE DISORDER (CMS-HCC): ICD-10-CM

## 2025-05-05 DIAGNOSIS — M25.551 PAIN OF RIGHT HIP: ICD-10-CM

## 2025-05-05 DIAGNOSIS — E55.9 VITAMIN D DEFICIENCY: ICD-10-CM

## 2025-05-05 DIAGNOSIS — H93.13 TINNITUS OF BOTH EARS: ICD-10-CM

## 2025-05-05 DIAGNOSIS — F41.9 ANXIETY: ICD-10-CM

## 2025-05-05 DIAGNOSIS — E11.69 TYPE 2 DIABETES MELLITUS WITH OTHER SPECIFIED COMPLICATION, WITHOUT LONG-TERM CURRENT USE OF INSULIN: Primary | ICD-10-CM

## 2025-05-05 PROCEDURE — 3074F SYST BP LT 130 MM HG: CPT | Performed by: FAMILY MEDICINE

## 2025-05-05 PROCEDURE — G2211 COMPLEX E/M VISIT ADD ON: HCPCS | Performed by: FAMILY MEDICINE

## 2025-05-05 PROCEDURE — 3079F DIAST BP 80-89 MM HG: CPT | Performed by: FAMILY MEDICINE

## 2025-05-05 PROCEDURE — 1160F RVW MEDS BY RX/DR IN RCRD: CPT | Performed by: FAMILY MEDICINE

## 2025-05-05 PROCEDURE — 1123F ACP DISCUSS/DSCN MKR DOCD: CPT | Performed by: FAMILY MEDICINE

## 2025-05-05 PROCEDURE — 99214 OFFICE O/P EST MOD 30 MIN: CPT | Performed by: FAMILY MEDICINE

## 2025-05-05 PROCEDURE — 1159F MED LIST DOCD IN RCRD: CPT | Performed by: FAMILY MEDICINE

## 2025-05-05 RX ORDER — CITALOPRAM 20 MG/1
20 TABLET, FILM COATED ORAL DAILY
Qty: 90 TABLET | Refills: 1 | Status: SHIPPED | OUTPATIENT
Start: 2025-05-05 | End: 2025-11-01

## 2025-05-05 RX ORDER — TRAZODONE HYDROCHLORIDE 100 MG/1
100 TABLET ORAL NIGHTLY
Qty: 90 TABLET | Refills: 1 | Status: SHIPPED | OUTPATIENT
Start: 2025-05-05 | End: 2025-11-01

## 2025-05-05 RX ORDER — BUPROPION HYDROCHLORIDE 150 MG/1
150 TABLET, EXTENDED RELEASE ORAL 2 TIMES DAILY
Qty: 180 TABLET | Refills: 1 | Status: SHIPPED | OUTPATIENT
Start: 2025-05-05 | End: 2025-11-01

## 2025-05-05 ASSESSMENT — ENCOUNTER SYMPTOMS
VOMITING: 0
CONSTIPATION: 0
DYSPHORIC MOOD: 0
ABDOMINAL PAIN: 0
DYSURIA: 0
SHORTNESS OF BREATH: 0
SLEEP DISTURBANCE: 0
HEADACHES: 0
MYALGIAS: 0
PALPITATIONS: 0
DIARRHEA: 0
NAUSEA: 0
DIZZINESS: 0
DIFFICULTY URINATING: 0
FATIGUE: 0
POLYDIPSIA: 0
POLYPHAGIA: 0

## 2025-05-05 NOTE — PATIENT INSTRUCTIONS
Recommend a predominant low fat whole foods plant based diet.  Cut back on meat, dairy, processed carbs, salt and oils(especially palm and coconut). Increase fiber in your diet.  Decrease alcohol as much as possible if you drink. Recommend regular exercise most days of the week(goal up to 150min per week). Also recommend good sleep habits aiming for 7-8 hours per night.     Obtain your blood work    Continue your current meds    You were referred to audiology    Return in 6 months for recheck, sooner if needed

## 2025-05-05 NOTE — PROGRESS NOTES
Subjective   Patient ID: Pamela Clements is a 69 y.o. female who presents for Hyperlipidemia (Recheck, review bw ), Anxiety, and Depression and multiple issues    HPI   Lipids: Significant improvement on statin.  Taking rarely.  Last LDL normal 76 (248)    Diabetes: Stable.  Last A1c 5.8%    Mood: Remains controlled.  Overall doing well.    Vitamin D: Due for recheck.  Last vitamin D  16.  Finished high-dose vitamin D D and now taking over-the-counter vitamin D    Insomnia: Remains controlled.    Tinnitus: Chronic and recurrent.  Worked around loud noises in the past.  Some hearing difficulty.  Feels echoing in her head.  No associated vertigo    Right hip pain: Chronic and recurrent.  Waiting to see orthopedics.  Asking for handicap sticker.  Walking long distances due to numbness and tingling her feet as well    Review of Systems   Constitutional:  Negative for fatigue.   HENT:  Positive for tinnitus.    Eyes:  Negative for visual disturbance.   Respiratory:  Negative for shortness of breath.    Cardiovascular:  Negative for chest pain and palpitations.   Gastrointestinal:  Negative for abdominal pain, constipation, diarrhea, nausea and vomiting.   Endocrine: Negative for polydipsia, polyphagia and polyuria.   Genitourinary:  Negative for difficulty urinating and dysuria.   Musculoskeletal:  Negative for myalgias.   Skin:  Negative for rash.   Neurological:  Negative for dizziness and headaches.   Psychiatric/Behavioral:  Negative for dysphoric mood and sleep disturbance.        Objective   /88   Pulse 77   Temp 36.6 °C (97.8 °F)   Wt 67.9 kg (149 lb 12.8 oz)   SpO2 94%   BMI 25.71 kg/m²     Physical Exam  Vitals and nursing note reviewed.   Constitutional:       General: She is not in acute distress.     Appearance: Normal appearance. She is not toxic-appearing.   HENT:      Head: Normocephalic.      Right Ear: Tympanic membrane normal.      Left Ear: Tympanic membrane normal.      Mouth/Throat:       Pharynx: Oropharynx is clear.   Eyes:      General: No scleral icterus.     Pupils: Pupils are equal, round, and reactive to light.   Neck:      Vascular: No carotid bruit.   Cardiovascular:      Rate and Rhythm: Normal rate and regular rhythm.      Heart sounds: No murmur heard.  Pulmonary:      Effort: Pulmonary effort is normal. No respiratory distress.      Breath sounds: Normal breath sounds.   Abdominal:      Palpations: Abdomen is soft.      Tenderness: There is no abdominal tenderness. There is no guarding.   Musculoskeletal:         General: No tenderness.      Cervical back: Neck supple.      Right lower leg: No edema.      Left lower leg: No edema.   Lymphadenopathy:      Cervical: No cervical adenopathy.   Skin:     General: Skin is warm.   Neurological:      General: No focal deficit present.      Mental Status: She is alert.      Cranial Nerves: No cranial nerve deficit.   Psychiatric:         Mood and Affect: Mood normal.         Assessment/Plan   Problem List Items Addressed This Visit           ICD-10-CM    Mixed hyperlipidemia E78.2    Relevant Orders    Comprehensive Metabolic Panel    Follow Up In Primary Care    Comprehensive Metabolic Panel    Lipid Panel    Vitamin D deficiency E55.9    Relevant Orders    Vitamin D 25-Hydroxy,Total (for eval of Vitamin D levels)    Follow Up In Primary Care    Insomnia G47.00    Relevant Medications    traZODone (Desyrel) 100 mg tablet    Other Relevant Orders    Follow Up In Primary Care    Type 2 diabetes mellitus with other specified complication, without long-term current use of insulin - Primary E11.69    Relevant Orders    Hemoglobin A1C    Follow Up In Primary Care    Comprehensive Metabolic Panel    Hemoglobin A1C     Other Visit Diagnoses         Codes      Tobacco use     Z72.0    Relevant Medications    buPROPion SR (Wellbutrin SR) 150 mg 12 hr tablet    Other Relevant Orders    Follow Up In Primary Care      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  Recommendations given.  Stable on other meds.

## 2025-05-12 RX ORDER — BUPROPION HYDROCHLORIDE 150 MG/1
150 TABLET, EXTENDED RELEASE ORAL 2 TIMES DAILY
Qty: 180 TABLET | Refills: 0 | OUTPATIENT
Start: 2025-05-12 | End: 2025-08-10

## 2025-05-28 ENCOUNTER — CLINICAL SUPPORT (OUTPATIENT)
Dept: AUDIOLOGY | Facility: HOSPITAL | Age: 70
End: 2025-05-28
Payer: MEDICARE

## 2025-05-28 DIAGNOSIS — H93.13 TINNITUS OF BOTH EARS: Primary | ICD-10-CM

## 2025-05-28 DIAGNOSIS — H90.3 SENSORINEURAL HEARING LOSS (SNHL) OF BOTH EARS: ICD-10-CM

## 2025-05-28 PROCEDURE — 92567 TYMPANOMETRY: CPT | Performed by: AUDIOLOGIST

## 2025-05-28 PROCEDURE — 92557 COMPREHENSIVE HEARING TEST: CPT | Performed by: AUDIOLOGIST

## 2025-05-28 ASSESSMENT — PAIN - FUNCTIONAL ASSESSMENT: PAIN_FUNCTIONAL_ASSESSMENT: 0-10

## 2025-05-28 ASSESSMENT — PAIN SCALES - GENERAL: PAINLEVEL_OUTOF10: 0 - NO PAIN

## 2025-05-28 NOTE — LETTER
"  May 28, 2025         Iveth Johnson DO  [address]  [address] [address]      Patient: Pamela Clements   YOB: 1955   Date of Visit: 2025       Dear Iveth Johnson DO:    Thank you for referring Pamela Clements to me for evaluation. Below are my notes for this consultation.  If you have questions, please do not hesitate to call me. I look forward to following your patient along with you.       Sincerely,     SHUKRI Galarza, CCC-A  Senior Audiologist      CC:   No Recipients  ______________________________________________________________________________________    AUDIOLOGY ADULT AUDIOMETRIC EVALUATION      Name:  Navya Clements \"Pamela\"  :  1955  Age:  69 y.o.  Date of Evaluation:  2025     IMPRESSIONS:  Today's test results are consistent with normal low frequency hearing sensitivity sloping to profound high frequency sensorineural hearing loss bilaterally, worse in the left ear.  The word discrimination scores are good bilaterally.  The tympanic membrane mobility is within normal limits bilaterally.  If there are no medical contraindications, this patient could be considered a good candidate for binaural amplification.     RECOMMENDATIONS:  -Medical / Otolaryngology consultation regarding reported \"echoing\" sound in her head and asymmetric hearing loss.  -Annual audiologic evaluation, as otherwise recommended by otolaryngologist, or as changes are noted.  -Hearing Aid consultation.  Patient should check with insurance regarding benefits and covered providers.   -Use of hearing protection devices whenever loud noises cannot be avoided.  -Consider use of tinnitus management options, which include but are not limited to the following:  sound therapy (use of pleasant or calming sounds that reduce tinnitus annoyance); mindfulness, meditation, and breathing exercises; sleep hygiene; lifestyle changes (exercise, etc.); use of hearing protection and avoidance of " "loud noise; and use of hearing aids when appropriate.    ------------------------------------------------    HISTORY:  Reason for visit:  Navya Clements \"Pamela\" is seen today at the request of Iveth Johnson DO for an evaluation of hearing.  Patient complains of Tinnitus and Hearing Loss (Gradually progressive hearing loss bilaterally. )  Reports own voice sounds like it is \"echoing\" in her head and voice sounds loud, onset about 2 years ago, worse with seasonal allergies.       Reports sensitivity to loud noises.      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   Family history of hearing loss:  yes, paternal grandparents with congenital hearing loss    multiple family members with tinnitus    Previous hearing test:  no  Otalgia:  no  Aural Fullness:  no  Otitis Media: no  PE Tubes:  no  Other otologic surgical history:  no  Dizziness:  no  Hearing aid history: none  Other significant history: none    EVALUATION    Otoscopic Evaluation:        Clear ear canal, tympanic membrane visualized bilaterally                Pure Tone Audiometry:  Conventional audiometry (125-8000Hz) via TDH headphones, confirmed with inserts, with good response reliability      Both ears: normal hearing sensitivity from 125-500Hz sloping to profound high frequency sensorineural hearing loss, worse in the left ear      Speech Audiometry:        Both ears:  Speech Reception Threshold (SRT) was obtained at 30 dBHL                 Word Recognition scores were good at 40dBSL re: SRT           Immittance Measures: 226Hz       Both ears:  Tympanogram shows normal middle ear pressure, static compliance, and ear canal volume.  Could not test acoustic reflexes due to poor ear canal seal and high physiologic noise in recording            Distortion Product Otoacoustic Emissions: " "Assesses the cochlear outer hair cell function (4720-3222 Hz frequency range)        Both ears:  absent 1500-8000Hz        PATIENT EDUCATION:   Discussed results and recommendations with Navya Clements \"Pamela\".  Questions were addressed and the patient was encouraged to contact our department should concerns arise.    Verified patient's identification verbally and by armband.    Time:  11:01 to 11:37    SHUKRI Galarza, CCC-A  Senior Audiologist      "

## 2025-05-28 NOTE — PROGRESS NOTES
"AUDIOLOGY ADULT AUDIOMETRIC EVALUATION      Name:  Navya Clements \"Pamela\"  :  1955  Age:  69 y.o.  Date of Evaluation:  2025     IMPRESSIONS:  Today's test results are consistent with normal low frequency hearing sensitivity sloping to profound high frequency sensorineural hearing loss bilaterally, worse in the left ear.  The word discrimination scores are good bilaterally.  The tympanic membrane mobility is within normal limits bilaterally.  If there are no medical contraindications, this patient could be considered a good candidate for binaural amplification.     RECOMMENDATIONS:  -Medical / Otolaryngology consultation regarding reported \"echoing\" sound in her head and asymmetric hearing loss.  -Annual audiologic evaluation, as otherwise recommended by otolaryngologist, or as changes are noted.  -Hearing Aid consultation.  Patient should check with insurance regarding benefits and covered providers.   -Use of hearing protection devices whenever loud noises cannot be avoided.  -Consider use of tinnitus management options, which include but are not limited to the following:  sound therapy (use of pleasant or calming sounds that reduce tinnitus annoyance); mindfulness, meditation, and breathing exercises; sleep hygiene; lifestyle changes (exercise, etc.); use of hearing protection and avoidance of loud noise; and use of hearing aids when appropriate.    ------------------------------------------------    HISTORY:  Reason for visit:  Navya Clements \"Pamela\" is seen today at the request of Iveth Johnson DO for an evaluation of hearing.  Patient complains of Tinnitus and Hearing Loss (Gradually progressive hearing loss bilaterally. )  Reports own voice sounds like it is \"echoing\" in her head and voice sounds loud, onset about 2 years ago, worse with seasonal allergies.       Reports sensitivity to loud noises.      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   Family history of hearing loss:  yes, paternal grandparents with congenital hearing loss    multiple family members with tinnitus    Previous hearing test:  no  Otalgia:  no  Aural Fullness:  no  Otitis Media: no  PE Tubes:  no  Other otologic surgical history:  no  Dizziness:  no  Hearing aid history: none  Other significant history: none    EVALUATION    Otoscopic Evaluation:        Clear ear canal, tympanic membrane visualized bilaterally                Pure Tone Audiometry:  Conventional audiometry (125-8000Hz) via TDH headphones, confirmed with inserts, with good response reliability      Both ears: normal hearing sensitivity from 125-500Hz sloping to profound high frequency sensorineural hearing loss, worse in the left ear      Speech Audiometry:        Both ears:  Speech Reception Threshold (SRT) was obtained at 30 dBHL                 Word Recognition scores were good at 40dBSL re: SRT           Immittance Measures: 226Hz       Both ears:  Tympanogram shows normal middle ear pressure, static compliance, and ear canal volume.  Could not test acoustic reflexes due to poor ear canal seal and high physiologic noise in recording            Distortion Product Otoacoustic Emissions: Assesses the cochlear outer hair cell function (3242-3042 Hz frequency range)        Both ears:  absent 1500-8000Hz        PATIENT EDUCATION:   Discussed results and recommendations with Nayva Clements \"Pamela\".  Questions were addressed and the patient was encouraged to contact our department should concerns arise.    Verified patient's identification verbally and by armband.    Time:  11:01 to 11:37    SHUKRI Galarza, CCC-A  Senior Audiologist  "

## 2025-05-31 NOTE — PROGRESS NOTES
Hepatology: Initial Office Note     Patient: Navya Clements, a 69 y.o. year old female presents for evaluation of positive hep C test.   PCP: Iveth Johnson, DO    History of Present Illness   Navya Clements presents for evaluation of positive hep C test.    Noted to have a positive HCV Ab but no HCV RNA on PCR this was checked in 2023 and 2024. In addition, evidence of positive HBV core Ab and sAb on labs. Was seen by Dr Maloney for the hepatitis serologies in July 2024.      She comes in to my office today- reports that she was advised to have hepatitis C evaluation and treatment. She reports this was advised by her PCP and orthopedic surgeon. As a result, she notes her hip surgery is yet to be scheduled. She denies acute symptoms.     She was noted to have mild elevations in liver enzymes this year, she was unaware about this. Denies acute illness around that time. Denies abx in the last 3-6 months. Denies new meds. Notes drinking 2-4 drinks of alcohol in 1 week.      Review of Systems   Constitutional/ General: No fever   Eyes: no yellow discoloration  ENT: normal   Cardiovascular: no chest pain, no palpitations  Respiratory: no shortness of breath  Gastrointestinal: denies abdominal pain, nausea, vomiting  Integumentary: no rashes, no yellow discoloration of skin  Neurologic: no weakness or numbness of extremities  Psychiatric: no mood fluctuations  Musculoskeletal: hip pain  Genitourinary: no dysuria, no hematuria    All other systems have been reviewed and are negative except as noted in the HPI and above.    PMHx: SLE, lymphocytic colitis, BRCA negative breast cancer, DM II, HLD, anxiety/depression.   PSHx: mastectomy.   Social hx: alcohol use- 2 drinks a week, occasionally, + hx of smoking, denies hx of illicit substance use. Hx of tattoo+.   Family hx: brothers with hx of AUD related liver ds.     Medications     Current Outpatient Medications   Medication Instructions    buPROPion SR (WELLBUTRIN SR)  150 mg, oral, 2 times daily, Do not crush, chew, or split.    citalopram (CELEXA) 20 mg, oral, Daily    fluticasone (Flonase) 50 mcg/actuation nasal spray 1 spray, Each Nostril, Daily, Shake gently. Before first use, prime pump. After use, clean tip and replace cap.    rosuvastatin (CRESTOR) 40 mg, oral, Daily    traZODone (DESYREL) 100 mg, oral, Nightly         Physical Examination     Vitals:    06/03/25 1108   BP: 134/90   Pulse: 77   Temp: 36 °C (96.8 °F)     Vitals:    06/03/25 1108   Weight: 65.8 kg (145 lb)     Body mass index is 24.89 kg/m².    Constitutional: awake, alert   Eyes: EOMI, anicteric sclera   Respiratory: Bilateral air entry equal    Cardiovascular: regular rate and rhythm, no lower extremity edema  Abdomen: soft, non tender, non distended, bowel sounds present   Neurologic: gross motor strength intact   Psychiatric: alert, appropriate mood and affect, oriented to time/place/person    Labs     Lab Results   Component Value Date    ALT 55 (H) 02/25/2025    ALT 26 11/19/2024    ALT 18 07/12/2024    AST 47 (H) 02/25/2025    AST 15 11/19/2024    AST 15 07/12/2024    ALKPHOS 82 02/25/2025    ALKPHOS 82 11/19/2024    ALKPHOS 64 07/12/2024    BILITOT 1.1 02/25/2025    BILITOT 1.1 11/19/2024    BILITOT 0.8 07/12/2024     Lab Results   Component Value Date    HEPBSAG Nonreactive 02/16/2024    HEPBSAB 189.4 (H) 01/31/2024    HEPBCAB Reactive (A) 02/16/2024    HEPCAB Reactive (A) 02/16/2024    HEPATOT Nonreactive 01/31/2024    TTGA <1.0 01/22/2024    HCVRNAPCRLOG  02/16/2024      Comment:      Not calculated     HCV RNA Result   Date Value Ref Range Status   02/16/2024 Not Detected Not detected Final     Labs 8/7/2023 hepatitis C antibody positive, hepatitis C RNA negative.     Imaging/Diagnostic testing   Colonoscopy Feb 2024: Impression  Single small angioectasia in the cecum; induced coagulation and hemostasis achieved with argon plasma coagulation  2 medium, flat (grade 2) hemorrhoids; bleeding was  observed  FINAL DIAGNOSIS      A.  ASCENDING COLON, RANDOM BIOPSIES:              - LYMPHOCYTIC (MICROSCOPIC) COLITIS; NEGATIVE FOR DYSPLASIA/MALIGNANCY.              - GRANULOMAS NOT PRESENT IN SUBMITTED MATERIAL.     B.  DESCENDING COLON, RANDOM BIOPSIES:              - LYMPHOCYTIC (MICROSCOPIC) COLITIS; NEGATIVE FOR DYSPLASIA/MALIGNANCY.              - GRANULOMAS NOT PRESENT IN SUBMITTED MATERIAL.     Assessment and Plan    Navya Clements, a 69 y.o. year old female presents for evaluation of positive hepatitis C testing. I have reviewed pertinent provider notes, labs and imaging studies. Discussed results and their interpretation with the patient today.    Encounter Diagnoses   Name Primary?    Hepatitis C antibody test positive     Elevated liver enzymes Yes     Orders Placed This Encounter   Procedures    Hepatitis C RNA, Quantitative, PCR    Hepatitis C Antibody    Hepatic Function Panel    Hepatitis A Antibody, Total    CBC and Auto Differential    LUPE with Reflex to SHEREE    Anti-Mitochondrial Antibody    Anti-Smooth Muscle Antibody    Immunoglobulins (IgG, IgA, IgM)     # She has a positive HCV Ab test but an undetected HCV RNA on PCR x2 in 2023 and 2024.   This would indicate no evidence of active or chronic infection with Hep C and as a result would not warrant treatment.   This indicates a prior exposure or infection with recovery.   Reviewed this with the pt today. She would like to proceed with having another screening of these labs done. Pending results, if serologies remain the same- no further evaluation is advised.     In addition, HBV serologies indicate prior exposure with recovery and no evidence of chronic infection. In the absence of immunosuppression- low risk of spontaneous reactivation.     # Hepatocellular pattern of liver enzyme elevations on labs in Feb   Acute onset, mild elevations noted.   Recommend repeating LFTs now to determine if these are still elevated or not. If they remain  elevated- will review further evaluation with imaging and labs.   Given hx of SLE, DM II, HLD- ddx for chronic liver enzyme elevations could include MASLD, AIH etc.   Advised avoidance of hepatotoxic agents including alcohol, herbal supplements.   Will review need for further work up based on results.   If labs normalize, pt can follow up with her PCP.

## 2025-06-03 ENCOUNTER — OFFICE VISIT (OUTPATIENT)
Dept: GASTROENTEROLOGY | Facility: CLINIC | Age: 70
End: 2025-06-03
Payer: MEDICARE

## 2025-06-03 VITALS
HEIGHT: 64 IN | HEART RATE: 77 BPM | WEIGHT: 145 LBS | SYSTOLIC BLOOD PRESSURE: 134 MMHG | BODY MASS INDEX: 24.75 KG/M2 | TEMPERATURE: 96.8 F | DIASTOLIC BLOOD PRESSURE: 90 MMHG

## 2025-06-03 DIAGNOSIS — R76.8 HEPATITIS C ANTIBODY TEST POSITIVE: ICD-10-CM

## 2025-06-03 DIAGNOSIS — R74.8 ELEVATED LIVER ENZYMES: Primary | ICD-10-CM

## 2025-06-03 PROCEDURE — 1159F MED LIST DOCD IN RCRD: CPT | Performed by: STUDENT IN AN ORGANIZED HEALTH CARE EDUCATION/TRAINING PROGRAM

## 2025-06-03 PROCEDURE — 3075F SYST BP GE 130 - 139MM HG: CPT | Performed by: STUDENT IN AN ORGANIZED HEALTH CARE EDUCATION/TRAINING PROGRAM

## 2025-06-03 PROCEDURE — 1126F AMNT PAIN NOTED NONE PRSNT: CPT | Performed by: STUDENT IN AN ORGANIZED HEALTH CARE EDUCATION/TRAINING PROGRAM

## 2025-06-03 PROCEDURE — 3008F BODY MASS INDEX DOCD: CPT | Performed by: STUDENT IN AN ORGANIZED HEALTH CARE EDUCATION/TRAINING PROGRAM

## 2025-06-03 PROCEDURE — 99214 OFFICE O/P EST MOD 30 MIN: CPT | Performed by: STUDENT IN AN ORGANIZED HEALTH CARE EDUCATION/TRAINING PROGRAM

## 2025-06-03 PROCEDURE — 3080F DIAST BP >= 90 MM HG: CPT | Performed by: STUDENT IN AN ORGANIZED HEALTH CARE EDUCATION/TRAINING PROGRAM

## 2025-06-03 PROCEDURE — 99212 OFFICE O/P EST SF 10 MIN: CPT | Performed by: STUDENT IN AN ORGANIZED HEALTH CARE EDUCATION/TRAINING PROGRAM

## 2025-06-03 ASSESSMENT — PAIN SCALES - GENERAL: PAINLEVEL_OUTOF10: 0-NO PAIN

## 2025-06-03 NOTE — PATIENT INSTRUCTIONS
Navya Clements,     Thank you for coming in for your hepatology office visit today.   As per our discussion, I recommend:     Have labs done for evaluation of the liver.   Based on prior testing, you have no evidence of hepatitis C infection- this was checked in 2023 and 2024. You have a positive antibody to hep C which indicates a prior infection or exposure but your RNA for Hep C is negative. This indicates no infection and as a result you would not need treatment for hep C. We will check these labs again with the blood work now.    Need for follow up will be determined based on test results.    If you have any trouble or need assistance with having this done, please reach out to my office staff at [phone] or my nurse coordinator at [phone] (Ms Lesa OROSCO).     I look forward to seeing you again. Thank you for allowing me to participate in your care.     Sincerely,  Mai Waters MD  Hepatology

## 2025-06-06 LAB
25(OH)D3+25(OH)D2 SERPL-MCNC: 40 NG/ML (ref 30–100)
ALBUMIN SERPL-MCNC: 4.5 G/DL (ref 3.6–5.1)
ALP SERPL-CCNC: 83 U/L (ref 37–153)
ALT SERPL-CCNC: 26 U/L (ref 6–29)
ANA SER QL IF: NEGATIVE
ANION GAP SERPL CALCULATED.4IONS-SCNC: 9 MMOL/L (CALC) (ref 7–17)
AST SERPL-CCNC: 19 U/L (ref 10–35)
BILIRUB SERPL-MCNC: 0.9 MG/DL (ref 0.2–1.2)
BUN SERPL-MCNC: 12 MG/DL (ref 7–25)
CALCIUM SERPL-MCNC: 9.6 MG/DL (ref 8.6–10.4)
CHLORIDE SERPL-SCNC: 106 MMOL/L (ref 98–110)
CO2 SERPL-SCNC: 27 MMOL/L (ref 20–32)
CREAT SERPL-MCNC: 0.98 MG/DL (ref 0.6–1)
EGFRCR SERPLBLD CKD-EPI 2021: 62 ML/MIN/1.73M2
EST. AVERAGE GLUCOSE BLD GHB EST-MCNC: 128 MG/DL
EST. AVERAGE GLUCOSE BLD GHB EST-SCNC: 7.1 MMOL/L
GLUCOSE SERPL-MCNC: 113 MG/DL (ref 65–99)
HBA1C MFR BLD: 6.1 %
IGA SERPL-MCNC: 107 MG/DL (ref 70–320)
IGG SERPL-MCNC: 698 MG/DL (ref 600–1540)
IGM SERPL-MCNC: 169 MG/DL (ref 50–300)
MITOCHONDRIA AB SER QL IF: NEGATIVE
POTASSIUM SERPL-SCNC: 4.1 MMOL/L (ref 3.5–5.3)
PROT SERPL-MCNC: 6.5 G/DL (ref 6.1–8.1)
SMOOTH MUSCLE AB SER QL IF: NEGATIVE
SODIUM SERPL-SCNC: 142 MMOL/L (ref 135–146)

## 2025-06-07 LAB
ALBUMIN SERPL-MCNC: 4.4 G/DL (ref 3.6–5.1)
ALBUMIN/GLOB SERPL: 2.1 (CALC) (ref 1–2.5)
ALP SERPL-CCNC: 83 U/L (ref 37–153)
ALT SERPL-CCNC: 26 U/L (ref 6–29)
AST SERPL-CCNC: 19 U/L (ref 10–35)
BASOPHILS # BLD AUTO: 67 CELLS/UL (ref 0–200)
BASOPHILS NFR BLD AUTO: 0.9 %
BILIRUB DIRECT SERPL-MCNC: 0.2 MG/DL
BILIRUB INDIRECT SERPL-MCNC: 0.7 MG/DL (CALC) (ref 0.2–1.2)
BILIRUB SERPL-MCNC: 0.9 MG/DL (ref 0.2–1.2)
EOSINOPHIL # BLD AUTO: 148 CELLS/UL (ref 15–500)
EOSINOPHIL NFR BLD AUTO: 2 %
ERYTHROCYTE [DISTWIDTH] IN BLOOD BY AUTOMATED COUNT: 13.4 % (ref 11–15)
GLOBULIN SER CALC-MCNC: 2.1 G/DL (CALC) (ref 1.9–3.7)
HAV AB SER QL IA: NORMAL
HCT VFR BLD AUTO: 42.1 % (ref 35–45)
HCV AB SERPL QL IA: REACTIVE
HCV RNA SERPL NAA+PROBE-ACNC: ABNORMAL IU/ML
HCV RNA SERPL NAA+PROBE-ACNC: NORMAL IU/ML
HCV RNA SERPL NAA+PROBE-LOG IU: ABNORMAL LOG IU/ML
HCV RNA SERPL NAA+PROBE-LOG IU: NORMAL LOG IU/ML
HGB BLD-MCNC: 13.7 G/DL (ref 11.7–15.5)
LYMPHOCYTES # BLD AUTO: 1502 CELLS/UL (ref 850–3900)
LYMPHOCYTES NFR BLD AUTO: 20.3 %
MCH RBC QN AUTO: 31.4 PG (ref 27–33)
MCHC RBC AUTO-ENTMCNC: 32.5 G/DL (ref 32–36)
MCV RBC AUTO: 96.3 FL (ref 80–100)
MONOCYTES # BLD AUTO: 481 CELLS/UL (ref 200–950)
MONOCYTES NFR BLD AUTO: 6.5 %
NEUTROPHILS # BLD AUTO: 5202 CELLS/UL (ref 1500–7800)
NEUTROPHILS NFR BLD AUTO: 70.3 %
PLATELET # BLD AUTO: 199 THOUSAND/UL (ref 140–400)
PMV BLD REES-ECKER: 11.5 FL (ref 7.5–12.5)
PROT SERPL-MCNC: 6.5 G/DL (ref 6.1–8.1)
QUEST HCV PCR (ALWAYS MESSAGE): ABNORMAL
RBC # BLD AUTO: 4.37 MILLION/UL (ref 3.8–5.1)
WBC # BLD AUTO: 7.4 THOUSAND/UL (ref 3.8–10.8)

## 2025-06-11 ENCOUNTER — TELEPHONE (OUTPATIENT)
Dept: ORTHOPEDIC SURGERY | Facility: CLINIC | Age: 70
End: 2025-06-11
Payer: MEDICARE

## 2025-06-11 NOTE — TELEPHONE ENCOUNTER
Patient was seen in December about her hips. She states that she had to have blood work done to make sure she was ok to proceed with surgery. She states that she had the labs done and she should be good now. Said that it took a long time to get into the liver specialist in [place].     Per note she had a positive Hep C result and was to get set up for antiviral treatments.     Please advise if she is good to go for surgery so we can set up a pre-op visit.

## 2025-06-12 ENCOUNTER — APPOINTMENT (OUTPATIENT)
Dept: OTOLARYNGOLOGY | Facility: CLINIC | Age: 70
End: 2025-06-12
Payer: MEDICARE

## 2025-07-09 ENCOUNTER — APPOINTMENT (OUTPATIENT)
Dept: OTOLARYNGOLOGY | Facility: CLINIC | Age: 70
End: 2025-07-09
Payer: MEDICARE

## 2025-07-09 VITALS — WEIGHT: 145 LBS | BODY MASS INDEX: 24.89 KG/M2

## 2025-07-09 DIAGNOSIS — H90.3 SENSORINEURAL HEARING LOSS (SNHL) OF BOTH EARS: Primary | ICD-10-CM

## 2025-07-09 DIAGNOSIS — J31.0 CHRONIC RHINITIS: ICD-10-CM

## 2025-07-09 DIAGNOSIS — H93.13 TINNITUS OF BOTH EARS: ICD-10-CM

## 2025-07-09 PROCEDURE — 99203 OFFICE O/P NEW LOW 30 MIN: CPT | Performed by: GENERAL PRACTICE

## 2025-07-09 PROCEDURE — 1159F MED LIST DOCD IN RCRD: CPT | Performed by: GENERAL PRACTICE

## 2025-07-09 PROCEDURE — G8433 SCR FOR DEP NOT CPT DOC RSN: HCPCS | Performed by: GENERAL PRACTICE

## 2025-07-09 NOTE — PROGRESS NOTES
"Otolaryngology - Head and Neck Surgery Outpatient New Patient Visit Note        Assessment/Plan:   Problem List Items Addressed This Visit    None  Visit Diagnoses         Codes      Sensorineural hearing loss (SNHL) of both ears    -  Primary H90.3      Tinnitus of both ears     H93.13      Chronic rhinitis     J31.0              69yoF with downsloping b/l SNHL, with slight ETD contributing to aural fullness and echo in ears.   Discussed use of flonase, zyrtec, neilmed PRN for rhinitis to aid in ETD.   Some sinusitis/otitis on CT in jan2025, but no ongoing evidence of infection.     Medically cleared for hearing aids.       Follow up:  -plan for follow up in clinic as needed    All of the above findings, impressions, treatment planning and follow up plans were discussed with the patient who indicated understanding.  the patient was instructed to contact or return to clinic sooner if symptoms/signs persist or worsen despite the above management.      Lacho Radford MD  Otolaryngology - Head and Neck Surgery            History Of Present Illness  Navya Clements \"Pamela\" is a 69 y.o. female presenting for eavaluaiton of hearing loss.    The patient reports slow progression of the hearing loss over time.  The paitent reports a history of intermittent, waxing/waning, nonpulsatile, tonal tinnitus which does not interfere with hearing.   The patient denies  a history of significant noise exposure due to occupational exposures, industrial noise, etc.     The patient denies sudden changes in hearing.  The patient denies otalgia, otorrhea, vertigo, or facial weakness.    The patient denies a history of otologic surgery or trauma.  The patient denies a history of blast injury, TBI or concussion associated with hearing loss.  The patient denies a family history of significant hearing loss.  The patient reports a history of AOM as a child, but no recent significant history of ear infection.  No reported exposure to known " ototoxins (chemotherapeutics, aminoglycosides, loop diurectics, high dose NSAIDs).   No reported history of radiation treatment to the head/neck.     Notes some waxing/waning ear pressure as well as some echoing of voice in ears.     Notes nasal congestion and allergy symptoms.  Uses flonase/zyrtec intermittently.              Past Medical History  She has a past medical history of Anxiety, Arthritis, Breast cancer (04/21), Depression, HL (hearing loss), antineoplastic chemo, Hyperlipidemia, Personal history of irradiation (2023), Sleep difficulties, and Tinnitus.    Surgical History  She has a past surgical history that includes Mastectomy (Left) and Breast biopsy (Right).     Social History  She reports that she has been smoking cigarettes. She has a 7.5 pack-year smoking history. She has never used smokeless tobacco. She reports current alcohol use of about 2.0 standard drinks of alcohol per week. She reports that she does not use drugs.    Family History  Family History[1]     Allergies  Patient has no known allergies.    Review of Systems  ROS: Pertinent positives as noted in HPI.    - CONSTITUTIONAL: Does not report weight loss, fever or chills.    - HEENT:   Ear: Does not report  , vertigo,    , otalgia, otorrhea  Nose: Does not report  ,  , epistaxis, decreased smell  Throat: Does not report pain, dysphagia, odynophagia  Larynx: Does not report hoarseness,  difficulty breathing, pain with speaking (odynophonia)  Neck: Does not report new masses, pain, swelling  Face: Does not report sinus pain, pressure, swelling, numbness, weakness     - RESPIRATORY: Does not report SOB or cough.    - CV: Does not report palpitations or chest pain.     - GI: Does not report abdominal pain, nausea, vomiting or diarrhea.    - : Does not report dysuria or urinary frequency.    - MSK: Does not report myalgia or joint pain.    - SKIN: Does not report rash or pruritus.    - NEUROLOGICAL: Does not report headache or  syncope.    - PSYCHIATRIC: Does not report recent changes in mood. Does not report anxiety or depression.         Physical Exam:     GENERAL:   Alert & Oriented to person, place and time; Normal affect and appearance. Well developed and well nourished. Conversant & cooperative with examination.     HEAD:   Normocephalic, atraumatic. No sinus tenderness to palpation. Normal parotid bilaterally. Normal facial strength.     NEUROLOGIC:   Cranial nerves II-XII grossly intact, gait WNL. Normal mood and affect.    EYES:   Extraocular movements intact. Pupils equal, round, reactive to light and accommodation. No nystagmus, no ptosis. no scleral injection.    EAR:   Normal auricle. No discomfort or TTP with manipulation.   Handheld otoscopic exam showed normal external auditory canals bilaterally. No purulence or EAC inflammation. Minimal cerumen.   Right tympanic membrane clear and mobile without evidence of perforation, retraction or middle ear effusion.   Left tympanic membrane clear and mobile without evidence of perforation, retraction or middle ear effusion.     NOSE:   No external deformity. No external nasal lesions, lacerations, or scars. Nasal tip symmetrical with normal nasal valves.   Nasal cavity with essentially midline septum, edematous  mucosa and turbinates. No lesions, masses, purulence or polyps.     OC/OP:   Mucous membranes moist, no masses, lesions or exudates.   Normal tongue, floor of mouth, teeth, gums, lips. Normal posterior pharyngeal wall.    Normal tonsils without erythema, exudate or obvious calculi     NECK:   No neck masses or thyroid enlargement. Trachea midline. No tenderness to palpation    LYMPHATIC:   No cervical lymphadenopathy.     RESPIRATORY:   Symmetric chest elevation & no retractions. No significant hoarseness. No increased work of breathing.    CV:   No clubbing or cyanosis. No obvious edema    Skin:   No facial rashes, vesicles or lesions.     Extremities:   No gross  abnormalities      Clinic Procedure        Information review:  External sources (notes, imaging, lab results) listed below personally reviewed to aid in medical decision making process.  -CT head 1/11/25  -audio 5/28/25  -         [1]   Family History  Problem Relation Name Age of Onset    Breast cancer Paternal Grandmother Cecily Spaulding     Breast cancer Paternal Grandmother Cecily Jasson

## 2025-09-02 ENCOUNTER — HOSPITAL ENCOUNTER (OUTPATIENT)
Dept: RADIOLOGY | Facility: HOSPITAL | Age: 70
Discharge: HOME | End: 2025-09-02
Payer: MEDICARE

## 2025-09-02 DIAGNOSIS — N63.15 MASS OVERLAPPING MULTIPLE QUADRANTS OF RIGHT BREAST: ICD-10-CM

## 2025-09-02 PROCEDURE — 77065 DX MAMMO INCL CAD UNI: CPT | Mod: RIGHT SIDE

## 2025-09-02 PROCEDURE — 77061 BREAST TOMOSYNTHESIS UNI: CPT | Mod: RIGHT SIDE

## 2025-09-02 PROCEDURE — 77065 DX MAMMO INCL CAD UNI: CPT | Mod: RT

## 2025-09-09 ENCOUNTER — APPOINTMENT (OUTPATIENT)
Facility: CLINIC | Age: 70
End: 2025-09-09
Payer: MEDICARE

## 2025-11-05 ENCOUNTER — APPOINTMENT (OUTPATIENT)
Dept: PRIMARY CARE | Facility: CLINIC | Age: 70
End: 2025-11-05
Payer: MEDICARE